# Patient Record
Sex: FEMALE | Race: WHITE | NOT HISPANIC OR LATINO | Employment: OTHER | URBAN - METROPOLITAN AREA
[De-identification: names, ages, dates, MRNs, and addresses within clinical notes are randomized per-mention and may not be internally consistent; named-entity substitution may affect disease eponyms.]

---

## 2017-02-13 ENCOUNTER — NURSE TRIAGE (OUTPATIENT)
Dept: ADMINISTRATIVE | Facility: CLINIC | Age: 82
End: 2017-02-13

## 2017-02-13 ENCOUNTER — OFFICE VISIT (OUTPATIENT)
Dept: FAMILY MEDICINE | Facility: CLINIC | Age: 82
End: 2017-02-13
Payer: MEDICARE

## 2017-02-13 ENCOUNTER — HOSPITAL ENCOUNTER (OUTPATIENT)
Dept: RADIOLOGY | Facility: HOSPITAL | Age: 82
Discharge: HOME | End: 2017-02-13
Attending: NURSE PRACTITIONER
Payer: MEDICARE

## 2017-02-13 VITALS
HEART RATE: 76 BPM | DIASTOLIC BLOOD PRESSURE: 62 MMHG | BODY MASS INDEX: 28.64 KG/M2 | HEIGHT: 68 IN | SYSTOLIC BLOOD PRESSURE: 128 MMHG | OXYGEN SATURATION: 95 % | TEMPERATURE: 98 F | WEIGHT: 189 LBS

## 2017-02-13 DIAGNOSIS — S00.83XA FACIAL CONTUSION, INITIAL ENCOUNTER: ICD-10-CM
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
## 2017-02-13 DIAGNOSIS — R06.09 DYSPNEA ON EXERTION: ICD-10-CM

## 2017-02-13 DIAGNOSIS — R06.2 WHEEZES: ICD-10-CM

## 2017-02-13 DIAGNOSIS — J18.9 PRIMARY ATYPICAL PNEUMONIA: Primary | ICD-10-CM

## 2017-02-13 DIAGNOSIS — R05.9 COUGH: ICD-10-CM

## 2017-02-13 PROCEDURE — 73030 X-RAY EXAM OF SHOULDER: CPT | Mod: TC,PO,RT

## 2017-02-13 PROCEDURE — 70150 X-RAY EXAM OF FACIAL BONES: CPT | Mod: 26,,, | Performed by: RADIOLOGY

## 2017-02-13 PROCEDURE — 1159F MED LIST DOCD IN RCRD: CPT | Mod: S$GLB,,, | Performed by: NURSE PRACTITIONER

## 2017-02-13 PROCEDURE — 1160F RVW MEDS BY RX/DR IN RCRD: CPT | Mod: S$GLB,,, | Performed by: NURSE PRACTITIONER

## 2017-02-13 PROCEDURE — 70150 X-RAY EXAM OF FACIAL BONES: CPT | Mod: TC,PO

## 2017-02-13 PROCEDURE — 73030 X-RAY EXAM OF SHOULDER: CPT | Mod: 26,RT,, | Performed by: RADIOLOGY

## 2017-02-13 PROCEDURE — 99214 OFFICE O/P EST MOD 30 MIN: CPT | Mod: S$GLB,,, | Performed by: NURSE PRACTITIONER

## 2017-02-13 PROCEDURE — 99999 PR PBB SHADOW E&M-EST. PATIENT-LVL V: CPT | Mod: PBBFAC,,, | Performed by: NURSE PRACTITIONER

## 2017-02-13 PROCEDURE — 1125F AMNT PAIN NOTED PAIN PRSNT: CPT | Mod: S$GLB,,, | Performed by: NURSE PRACTITIONER

## 2017-02-13 PROCEDURE — 1157F ADVNC CARE PLAN IN RCRD: CPT | Mod: S$GLB,,, | Performed by: NURSE PRACTITIONER

## 2017-02-13 RX ORDER — MULTIVITAMIN
1 TABLET ORAL DAILY
COMMUNITY
End: 2019-08-08 | Stop reason: SDUPTHER

## 2017-02-13 RX ORDER — ACETAMINOPHEN 500 MG
5000 TABLET ORAL DAILY
COMMUNITY

## 2017-02-13 RX ORDER — AZITHROMYCIN 250 MG/1
TABLET, FILM COATED ORAL
Qty: 6 TABLET | Refills: 0 | Status: SHIPPED | OUTPATIENT
Start: 2017-02-13 | End: 2017-02-18

## 2017-02-13 RX ORDER — GUAIFENESIN 600 MG/1
1200 TABLET, EXTENDED RELEASE ORAL 2 TIMES DAILY
COMMUNITY
End: 2018-08-23

## 2017-02-13 RX ORDER — ALBUTEROL SULFATE 90 UG/1
2 AEROSOL, METERED RESPIRATORY (INHALATION) EVERY 6 HOURS PRN
Qty: 1 INHALER | Refills: 0 | Status: SHIPPED | OUTPATIENT
Start: 2017-02-13 | End: 2017-04-12 | Stop reason: SDUPTHER

## 2017-02-13 RX ORDER — LANOLIN ALCOHOL/MO/W.PET/CERES
100 CREAM (GRAM) TOPICAL DAILY
COMMUNITY

## 2017-02-13 RX ORDER — LEVOFLOXACIN 500 MG/1
500 TABLET, FILM COATED ORAL DAILY
Qty: 10 TABLET | Refills: 0 | Status: SHIPPED | OUTPATIENT
Start: 2017-02-13 | End: 2017-02-23

## 2017-02-13 NOTE — PATIENT INSTRUCTIONS
Pneumonia (Adult)  Pneumonia is an infection deep within the lungs. It is in the small air sacs (alveoli). Pneumonia may be caused by a virus or bacteria. Pneumonia caused by bacteria is usually treated with an antibiotic. Severe cases may need to be treated in the hospital. Milder cases can be treated at home. Symptoms usually start to get better during the first 2 days of treatment.    Home care  Follow these guidelines when caring for yourself at home:  · Rest at home for the first 2 to 3 days, or until you feel stronger. Dont let yourself get overly tired when you go back to your activities.  · Stay away from cigarette smoke - yours or other peoples.  · You may use acetaminophen or ibuprofen to control fever or pain, unless another medicine was prescribed. If you have chronic liver or kidney disease, talk with your health care provider before using these medicines. Also talk with your provider if youve had a stomach ulcer or GI bleeding. Dont give aspirin to anyone younger than 18 years of age who is ill with a fever. It may cause severe liver damage.  · Your appetite may be poor, so a light diet is fine.  · Drink 6 to 8 glasses of fluids every day to make sure you are getting enough fluids. Beverages can include water, sport drinks, sodas without caffeine, juices, tea, or soup. Fluids will help loosen secretions in the lung. This will make it easier for you to cough up the phlegm (sputum). If you also have heart or kidney disease, check with your health care provider before you drink extra fluids.  · Take antibiotic medicine prescribed until it is all gone, even if you are feeling better after a few days.  Follow-up care  Follow up with your health care provider in the next 2 to 3 days, or as advised. This is to be sure the medicine is helping you get better.  If you are 65 or older, you should get a pneumococcal vaccine and a yearly flu (influenza) shot. You should also get these vaccines if you have  chronic lung disease like asthma, emphysema, or COPD. Ask your provider about this.  When to seek medical advice  Call your health care provider right away if any of these occur:  · You dont get better within the first 48 hours of treatment  · Shortness of breath gets worse  · Rapid breathing (more than 25 breaths per minute)  · Coughing up blood  · Chest pain gets worse with breathing  · Fever of 102°F (38°C) or higher that doesnt get better with fever medicine  · Weakness, dizziness, or fainting that gets worse  · Thirst or dry mouth that gets worse  · Sinus pain, headache, or a stiff neck  · Chest pain not caused by coughing  Date Last Reviewed: 12/23/2014  © 2727-9838 [employer]. [address] [place] [address], [place] [address]. All rights reserved. This information is not intended as a substitute for professional medical care. Always follow your healthcare professional's instructions.        Preventing Falls: Are You At Risk of Falling?  As you get older, you're not as steady on your feet as you once were. And you may have health problems you didn't have when you were younger. So, it's not surprising that older people are more likely to trip and fall. Falling can be very serious. It can change your overall health and quality of life. That's why it's important to be aware of your own risk of falling.    The dangers of falling  Falls are one of the main causes of injury in people over age 65. An older person who falls may take longer to get better than a younger person. And, after a fall, an older person is more likely to have problems that don't go away. So, preventing falls can help you avoid serious health problems.  Are you at risk of falling?  Answer these questions to rate your level of risk.  · Are you a woman?  · Have you fallen or stumbled in the last year?  · Are you over age 65?  · Are you ever dizzy or lightheaded with standing?  · Do you have a hard time getting in and out of the bathtub or  "on and off the toilet?  · Do you lean on objects to help you get around? Or do you use a cane or walker?  · Do you have vision or hearing problems? For example, do you need new glasses or hearing aids?  · Do you have 2 or more long-lasting (chronic) medical conditions?  · Do you take 3 or more medicines?  · Have you felt depressed recently?  · Have you had more trouble with your memory in recent months?  · Are there hazards in your home that might cause you to fall, such as loose rugs or poor lighting?  · Do you have a pet that jumps on you or might trip you?  · Have you stopped getting regular exercise?  · Do you have diabetes?   · Do you have a neurologic disease, such as Parkinson or Alzheimer disease?   · Do you drink alcohol?  · Do you wear athletic shoes or slippers, or go barefoot at home?  You can help prevent falls  If you answered "yes" to any of the above questions, you should take steps to reduce your risk of a fall. Monitoring health conditions and keeping walkways in your home free of clutter are just 2 ways. Changing is sometimes easier said than done. But keep in mind that even small changes can make you less likely to fall.  The fear of falling  It's normal to be scared of falling, especially if you've fallen before. But being afraid can actually make you more likely to fall. This is because:  · Fear might cause you to become less active. Being less active can lead to a loss of strength and balance.  · Fear can lead to isolation from others, depression, or the use of more medicines or alcohol. And all these things make falling even more likely.  To break the cycle, learn more about ways to avoid falling. As you take control, you may find yourself feeling less afraid.   Date Last Reviewed: 6/12/2015  © 3733-8330 [employer]. [address] [place] [address], [place] [address]. All rights reserved. This information is not intended as a substitute for professional medical care. Always follow your " healthcare professional's instructions.

## 2017-02-13 NOTE — MR AVS SNAPSHOT
[facility]  [address]  [place] [address]  Phone: [phone]  Fax: [phone]                  Tona Leone   2017 1:00 PM   Office Visit    Description:  Female : 1927   Provider:  LAPALCO, WALK IN   Department:  [facility]           Reason for Visit     URI     Fall           Diagnoses this Visit        Comments    Primary atypical pneumonia    -  Primary     Fall, initial encounter         Facial contusion, initial encounter         Cough         Wheezes         Dyspnea on exertion                To Do List           Future Appointments        Provider Department Dept Phone    2017 1:00 PM MERCED SAUNDERS IN [facility] [phone]      Goals (5 Years of Data)     None      Follow-Up and Disposition     Return if symptoms worsen or fail to improve.       These Medications        Disp Refills Start End    azithromycin (Z-BEHZAD) 250 MG tablet 6 tablet 0 2017    Take 2 tablets by mouth on day 1; Take 1 tablet by mouth on days 2-5    Pharmacy: [facility]/pharmacy #[id number] - [place] - [address] [facility] Ph #: [phone]       albuterol 90 mcg/actuation inhaler 1 Inhaler 0 2017     Inhale 2 puffs into the lungs every 6 (six) hours as needed for Wheezing. Rescue - Inhalation    Pharmacy: [facility]acy #[id number] - [place] - [address] [facility] Ph #: [phone]         [facility]     [facility] On Call Nurse [facility] -  Assistance  Registered nurses in the [facility] On Call Center provide clinical advisement, health education, appointment booking, and other advisory services.  Call for this free service at [phone].             Medications           Message regarding Medications     Verify the changes and/or additions to your medication regime listed below are the same as discussed with your clinician today.  If any of these changes or additions are incorrect, please notify your healthcare provider.         START taking these NEW medications        Refills    azithromycin (Z-BEHZAD) 250 MG tablet 0    Sig: Take 2 tablets by mouth on day 1; Take 1 tablet by mouth on days 2-5    Class: Normal    albuterol 90 mcg/actuation inhaler 0    Sig: Inhale 2 puffs into the lungs every 6 (six) hours as needed for Wheezing. Rescue    Class: Normal    Route: Inhalation      STOP taking these medications     FLUZONE HIGH-DOSE 2015-16, PF, 180 mcg/0.5 mL Syrg            Verify that the below list of medications is an accurate representation of the medications you are currently taking.  If none reported, the list may be blank. If incorrect, please contact your healthcare provider. Carry this list with you in case of emergency.           Current Medications     albuterol 90 mcg/actuation inhaler Inhale 2 puffs into the lungs every 6 (six) hours as needed for Wheezing. Rescue    allopurinol (ZYLOPRIM) 300 MG tablet Take 1 tablet (300 mg total) by mouth once daily.    amlodipine (NORVASC) 10 MG tablet Take 1 tablet (10 mg total) by mouth once daily.    aspirin 81 MG Chew Take 81 mg by mouth once daily.    atorvastatin (LIPITOR) 40 MG tablet Take 1 tablet (40 mg total) by mouth every evening.    azithromycin (Z-BEHZAD) 250 MG tablet Take 2 tablets by mouth on day 1; Take 1 tablet by mouth on days 2-5    cholecalciferol, vitamin D3, (VITAMIN D3) 5,000 unit Tab Take 5,000 Units by mouth once daily.    cyanocobalamin (VITAMIN B-12) 1000 MCG tablet Take 100 mcg by mouth once daily.    desonide (DESOWEN) 0.05 % lotion Apply topically 2 (two) times daily.    guaifenesin (MUCINEX) 600 mg 12 hr tablet Take 1,200 mg by mouth 2 (two) times daily.    GUAIFENESIN/DEXTROMETHORPHAN (ROBITUSSIN-COUGH-CHEST-PATRICK ORAL) Take by mouth.    hydrocodone-acetaminophen 5-325mg (NORCO) 5-325 mg per tablet     losartan (COZAAR) 100 MG tablet Take 1 tablet (100 mg total) by mouth once daily.    metoprolol tartrate (LOPRESSOR) 100 MG tablet Take 1 tablet (100 mg total) by  "mouth once daily.    multivitamin (ONE DAILY MULTIVITAMIN) per tablet Take 1 tablet by mouth once daily.    oxybutynin (DITROPAN-XL) 5 MG TR24 Take 1 tablet (5 mg total) by mouth once daily.    pantoprazole (PROTONIX) 40 MG tablet Take 1 tablet (40 mg total) by mouth once daily.    paroxetine (PAXIL) 40 MG tablet Take 1 tablet (40 mg total) by mouth once daily.    POLYETHYLENE GLYCOL 3350 (MIRALAX ORAL) Take by mouth.    potassium chloride SA (K-DUR,KLOR-CON) 20 MEQ tablet Take 1 tablet (20 mEq total) by mouth once daily.    propafenone (RHTHYMOL) 150 MG Tab Take 1 tablet (150 mg total) by mouth 2 (two) times daily.           Clinical Reference Information           Your Vitals Were     BP Pulse Temp Height Weight SpO2    128/62 (BP Location: Right arm, Patient Position: Sitting, BP Method: Manual) 76 97.5 °F (36.4 °C) (Oral) 5' 8" (1.727 m) 85.7 kg (189 lb) 95%    BMI                28.74 kg/m2          Blood Pressure          Most Recent Value    BP  128/62      Allergies as of 2/13/2017     Ciprocinonide    Niacin Preparations    Pcn [Penicillins]    Tetracyclic Antidepressants      Immunizations Administered on Date of Encounter - 2/13/2017     None      Orders Placed During Today's Visit     Future Labs/Procedures Expected by Expires    X-Ray Facial Bones  3 Or More View  2/13/2017 2/13/2018    X-ray Shoulder 2 or More Views Right  2/13/2017 2/13/2018      Instructions      Pneumonia (Adult)  Pneumonia is an infection deep within the lungs. It is in the small air sacs (alveoli). Pneumonia may be caused by a virus or bacteria. Pneumonia caused by bacteria is usually treated with an antibiotic. Severe cases may need to be treated in the hospital. Milder cases can be treated at home. Symptoms usually start to get better during the first 2 days of treatment.    Home care  Follow these guidelines when caring for yourself at home:  · Rest at home for the first 2 to 3 days, or until you feel stronger. Dont let " yourself get overly tired when you go back to your activities.  · Stay away from cigarette smoke - yours or other peoples.  · You may use acetaminophen or ibuprofen to control fever or pain, unless another medicine was prescribed. If you have chronic liver or kidney disease, talk with your health care provider before using these medicines. Also talk with your provider if youve had a stomach ulcer or GI bleeding. Dont give aspirin to anyone younger than 18 years of age who is ill with a fever. It may cause severe liver damage.  · Your appetite may be poor, so a light diet is fine.  · Drink 6 to 8 glasses of fluids every day to make sure you are getting enough fluids. Beverages can include water, sport drinks, sodas without caffeine, juices, tea, or soup. Fluids will help loosen secretions in the lung. This will make it easier for you to cough up the phlegm (sputum). If you also have heart or kidney disease, check with your health care provider before you drink extra fluids.  · Take antibiotic medicine prescribed until it is all gone, even if you are feeling better after a few days.  Follow-up care  Follow up with your health care provider in the next 2 to 3 days, or as advised. This is to be sure the medicine is helping you get better.  If you are 65 or older, you should get a pneumococcal vaccine and a yearly flu (influenza) shot. You should also get these vaccines if you have chronic lung disease like asthma, emphysema, or COPD. Ask your provider about this.  When to seek medical advice  Call your health care provider right away if any of these occur:  · You dont get better within the first 48 hours of treatment  · Shortness of breath gets worse  · Rapid breathing (more than 25 breaths per minute)  · Coughing up blood  · Chest pain gets worse with breathing  · Fever of 102°F (38°C) or higher that doesnt get better with fever medicine  · Weakness, dizziness, or fainting that gets worse  · Thirst or dry mouth  that gets worse  · Sinus pain, headache, or a stiff neck  · Chest pain not caused by coughing  Date Last Reviewed: 12/23/2014  © 2061-2942 [employer]. [address] [place] [address], [place] [address]. All rights reserved. This information is not intended as a substitute for professional medical care. Always follow your healthcare professional's instructions.        Preventing Falls: Are You At Risk of Falling?  As you get older, you're not as steady on your feet as you once were. And you may have health problems you didn't have when you were younger. So, it's not surprising that older people are more likely to trip and fall. Falling can be very serious. It can change your overall health and quality of life. That's why it's important to be aware of your own risk of falling.    The dangers of falling  Falls are one of the main causes of injury in people over age 65. An older person who falls may take longer to get better than a younger person. And, after a fall, an older person is more likely to have problems that don't go away. So, preventing falls can help you avoid serious health problems.  Are you at risk of falling?  Answer these questions to rate your level of risk.  · Are you a woman?  · Have you fallen or stumbled in the last year?  · Are you over age 65?  · Are you ever dizzy or lightheaded with standing?  · Do you have a hard time getting in and out of the bathtub or on and off the toilet?  · Do you lean on objects to help you get around? Or do you use a cane or walker?  · Do you have vision or hearing problems? For example, do you need new glasses or hearing aids?  · Do you have 2 or more long-lasting (chronic) medical conditions?  · Do you take 3 or more medicines?  · Have you felt depressed recently?  · Have you had more trouble with your memory in recent months?  · Are there hazards in your home that might cause you to fall, such as loose rugs or poor lighting?  · Do you have a pet that jumps on  "you or might trip you?  · Have you stopped getting regular exercise?  · Do you have diabetes?   · Do you have a neurologic disease, such as Parkinson or Alzheimer disease?   · Do you drink alcohol?  · Do you wear athletic shoes or slippers, or go barefoot at home?  You can help prevent falls  If you answered "yes" to any of the above questions, you should take steps to reduce your risk of a fall. Monitoring health conditions and keeping walkways in your home free of clutter are just 2 ways. Changing is sometimes easier said than done. But keep in mind that even small changes can make you less likely to fall.  The fear of falling  It's normal to be scared of falling, especially if you've fallen before. But being afraid can actually make you more likely to fall. This is because:  · Fear might cause you to become less active. Being less active can lead to a loss of strength and balance.  · Fear can lead to isolation from others, depression, or the use of more medicines or alcohol. And all these things make falling even more likely.  To break the cycle, learn more about ways to avoid falling. As you take control, you may find yourself feeling less afraid.   Date Last Reviewed: 6/12/2015  © 5480-5517 [employer]. [address] [place] [address], [place] [address]. All rights reserved. This information is not intended as a substitute for professional medical care. Always follow your healthcare professional's instructions.             Language Assistance Services     ATTENTION: Language assistance services are available, free of charge. Please call [phone].      ATENCIÓN: Si habla carolinaañol, tiene a puckett disposición servicios gratuitos de asistencia lingüística. Llame al [phone].     [facility] Ý: N?u b?n nói Ti?ng Vi?t, có các d?ch v? h? tr? ngôn ng? mi?n phí dành cho b?n. G?i s? [phone].         [facility] complies with applicable Federal civil rights laws and does not discriminate on the " basis of race, color, national origin, age, disability, or sex.

## 2017-02-13 NOTE — PROGRESS NOTES
History of Present Illness   Tona Leone is a 89 y.o. woman with medical history as listed below who presents today with her son for evaluation of multiple medical complaints. She states for the past two weeks she has had productive cough with wheezing and shortness of breath with minimal exertion. She also complains of feeling feverish with generalized fatigue and weakness. She has been taking Mucinex every 12 hours which has helped to break up the cough but has provided no additional relief. She also presents today for evaluation after a fall. She states three days ago she got up in the middle of the night to use the restroom and had a trip and fall. She denies LOC with fall. She has bruising with hematoma around left eye. She also complains of worsening of chronic right shoulder pain since fall. She has been taking her prescribed pain medication which helps with pain. She has also been using ice to the area which has helped greatly with swelling. She has no additional complaints and is otherwise healthy on today's visit.       Past Medical History   Diagnosis Date    Arthritis     Atrial fibrillation 7/26/2012    Depressed 4/8/2014    GERD (gastroesophageal reflux disease)     Gout, arthritis     Gout, unspecified 4/8/2014    HTN (hypertension) 7/26/2012    Hyperlipemia 7/26/2012    Hyperlipidemia     Hypertension     Postmenopausal 4/8/2014    Vitamin D deficiency disease 4/8/2014       Past Surgical History   Procedure Laterality Date    Cataract extraction       ou       Social History     Social History    Marital status:      Spouse name: N/A    Number of children: N/A    Years of education: N/A     Social History Main Topics    Smoking status: Former Smoker    Smokeless tobacco: None    Alcohol use No    Drug use: None    Sexual activity: Not Asked     Other Topics Concern    None     Social History Narrative       Family History   Problem Relation Age of Onset     "Hypertension Mother     Cataracts Mother     Hypertension Father     Cataracts Father     Hypertension Brother     Cataracts Brother     Amblyopia Neg Hx     Blindness Neg Hx     Cancer Neg Hx     Diabetes Neg Hx     Macular degeneration Neg Hx     Retinal detachment Neg Hx     Strabismus Neg Hx     Stroke Neg Hx     Thyroid disease Neg Hx        Review of Systems  Review of Systems   Constitutional: Positive for chills, fever and malaise/fatigue.   HENT: Negative for congestion and sore throat.    Respiratory: Positive for cough, sputum production, shortness of breath and wheezing.    Cardiovascular: Negative for chest pain and palpitations.   Musculoskeletal: Positive for falls and joint pain (right shoulder).   Skin:        Positive for bruising and abrasions     Neurological: Positive for weakness. Negative for loss of consciousness.     A complete review of systems was otherwise negative.    Physical Exam  Visit Vitals    /62 (BP Location: Right arm, Patient Position: Sitting, BP Method: Manual)    Pulse 76    Temp 97.5 °F (36.4 °C) (Oral)    Ht 5' 8" (1.727 m)    Wt 85.7 kg (189 lb)    SpO2 95%    BMI 28.74 kg/m2     General appearance: alert, appears stated age, cooperative and no distress  Head: Normocephalic, without obvious abnormality, contusion with hematoma around left eye  Eyes: negative findings: lids and lashes normal and conjunctivae and sclerae normal  Ears: normal TM's and external ear canals both ears  Nose: Nares normal. Septum midline. Mucosa normal. No drainage or sinus tenderness.  Throat: lips, mucosa, and tongue normal; teeth and gums normal  Lungs: bilateral expiratory wheeze with normal effort and rate. no rales or rhonchi  Heart: regular rate and rhythm, S1, S2 normal, no murmur, click, rub or gallop  Lymph nodes: Cervical, supraclavicular, and axillary nodes normal.  Neurologic: Grossly normal  Musculoskeletal: Limited ROM to right shoulder, no swelling or " obvious deformity. Distal sensation, ROM, and pulses intact.    Assessment/Plan  Primary atypical pneumonia  Treat with Azithromycin with Albuterol inhaler for as needed use. Continue Mucinex every 12 hours. Plenty of rest and plenty of fluids. Tylenol as needed for fever or body aches. Contact me or RTC if symptoms worsen or fail to improve as expected.  -     azithromycin (Z-BEHZAD) 250 MG tablet; Take 2 tablets by mouth on day 1; Take 1 tablet by mouth on days 2-5  Dispense: 6 tablet; Refill: 0  -     albuterol 90 mcg/actuation inhaler; Inhale 2 puffs into the lungs every 6 (six) hours as needed for Wheezing. Rescue  Dispense: 1 Inhaler; Refill: 0    Fall, initial encounter  X-ray for further evaluation. Continue to use pain medication as prescribed. May use ice to the area. Rest affected area. Contact me or RTC if symptoms worsen or fail to improve.  -     X-ray Shoulder 2 or More Views Right; Future; Expected date: 2/13/17  -     X-Ray Facial Bones  3 Or More View; Future; Expected date: 2/13/17    Facial contusion, initial encounter  As above.  -     X-ray Shoulder 2 or More Views Right; Future; Expected date: 2/13/17  -     X-Ray Facial Bones  3 Or More View; Future; Expected date: 2/13/17    Cough  As above.  -     azithromycin (Z-BEHZAD) 250 MG tablet; Take 2 tablets by mouth on day 1; Take 1 tablet by mouth on days 2-5  Dispense: 6 tablet; Refill: 0  -     albuterol 90 mcg/actuation inhaler; Inhale 2 puffs into the lungs every 6 (six) hours as needed for Wheezing. Rescue  Dispense: 1 Inhaler; Refill: 0    Wheezes  As above.  -     azithromycin (Z-BEHZAD) 250 MG tablet; Take 2 tablets by mouth on day 1; Take 1 tablet by mouth on days 2-5  Dispense: 6 tablet; Refill: 0  -     albuterol 90 mcg/actuation inhaler; Inhale 2 puffs into the lungs every 6 (six) hours as needed for Wheezing. Rescue  Dispense: 1 Inhaler; Refill: 0    Dyspnea on exertion  As above.   -     azithromycin (Z-BEHZAD) 250 MG tablet; Take 2 tablets by  mouth on day 1; Take 1 tablet by mouth on days 2-5  Dispense: 6 tablet; Refill: 0  -     albuterol 90 mcg/actuation inhaler; Inhale 2 puffs into the lungs every 6 (six) hours as needed for Wheezing. Rescue  Dispense: 1 Inhaler; Refill: 0      Return if symptoms worsen or fail to improve.

## 2017-02-14 NOTE — TELEPHONE ENCOUNTER
Son  States pt on rhythmol for irregular heart rate and is wanting to know if it is ok for pt to take azithromycin as prescribed. Dr. Donahue on call provider for outside family medicine paged  at this time.,

## 2017-02-14 NOTE — TELEPHONE ENCOUNTER
Went to a NP today and was prescribed a medication azithromycin for bacterial pneumonia. Precautions said something about an irregular heart beat. [facility] states that they would notify NP about whether or not pt can take medication.

## 2017-02-14 NOTE — TELEPHONE ENCOUNTER
Reason for Disposition   Caller has URGENT medication question about med that PCP prescribed and triager unable to answer question    Protocols used: ST MEDICATION QUESTION CALL-A-  Spoke with Dr. Donahue. Order given to contact pharmacist to see which medication would be best Levofloxacin or Doxycycline.  Contacted Rand[facility] pharmacist at [facility] on pt's profile and discussed.  Pharmacist suggested Levofloxacin.  Dr. Donahue contacted back for dosage at this time. Order given with dosage for levofloxacin. Called in to [facility] pharmacy. Levofloxacin 500mg one by mouth daily X 10 days., No refills. Spoke with caller and advised to contact pharmacy for .  Verbalized understanding.

## 2017-02-15 ENCOUNTER — TELEPHONE (OUTPATIENT)
Dept: FAMILY MEDICINE | Facility: CLINIC | Age: 82
End: 2017-02-15

## 2017-02-15 NOTE — TELEPHONE ENCOUNTER
----- Message from Jenny Zavala sent at 2/15/2017  1:24 PM CST -----  Contact: self  Pt needs XRAY results. Please call [phone]. Thanks

## 2017-04-10 DIAGNOSIS — R06.2 WHEEZES: ICD-10-CM

## 2017-04-10 DIAGNOSIS — J18.9 PRIMARY ATYPICAL PNEUMONIA: ICD-10-CM

## 2017-04-10 DIAGNOSIS — R05.9 COUGH: ICD-10-CM

## 2017-04-10 DIAGNOSIS — R06.09 DYSPNEA ON EXERTION: ICD-10-CM

## 2017-04-10 RX ORDER — ALBUTEROL SULFATE 90 UG/1
AEROSOL, METERED RESPIRATORY (INHALATION)
Qty: 18 INHALER | Refills: 0 | OUTPATIENT
Start: 2017-04-10

## 2017-04-12 DIAGNOSIS — R06.09 DYSPNEA ON EXERTION: ICD-10-CM

## 2017-04-12 DIAGNOSIS — R05.9 COUGH: ICD-10-CM

## 2017-04-12 DIAGNOSIS — J18.9 PRIMARY ATYPICAL PNEUMONIA: ICD-10-CM

## 2017-04-12 DIAGNOSIS — R06.2 WHEEZES: ICD-10-CM

## 2017-04-12 RX ORDER — ALBUTEROL SULFATE 90 UG/1
2 AEROSOL, METERED RESPIRATORY (INHALATION) EVERY 6 HOURS PRN
Qty: 1 INHALER | Refills: 0 | Status: SHIPPED | OUTPATIENT
Start: 2017-04-12 | End: 2019-05-16 | Stop reason: CLARIF

## 2017-04-12 NOTE — TELEPHONE ENCOUNTER
----- Message from Aye Drummond sent at 4/12/2017  2:52 PM CDT -----  Contact: daughter  Pt daughter calling to discuss getting a refill of pt Ventolin. Please call to verify medication first at Nory [phone].

## 2017-06-29 ENCOUNTER — OFFICE VISIT (OUTPATIENT)
Dept: FAMILY MEDICINE | Facility: CLINIC | Age: 82
End: 2017-06-29
Payer: MEDICARE

## 2017-06-29 VITALS
SYSTOLIC BLOOD PRESSURE: 114 MMHG | DIASTOLIC BLOOD PRESSURE: 60 MMHG | TEMPERATURE: 98 F | OXYGEN SATURATION: 94 % | HEIGHT: 68 IN | HEART RATE: 74 BPM

## 2017-06-29 DIAGNOSIS — R06.81 APNEA: ICD-10-CM

## 2017-06-29 DIAGNOSIS — I50.21 ACUTE SYSTOLIC HEART FAILURE: ICD-10-CM

## 2017-06-29 DIAGNOSIS — I48.0 PAROXYSMAL ATRIAL FIBRILLATION: Primary | ICD-10-CM

## 2017-06-29 DIAGNOSIS — I73.9 PAD (PERIPHERAL ARTERY DISEASE): ICD-10-CM

## 2017-06-29 PROCEDURE — 1159F MED LIST DOCD IN RCRD: CPT | Mod: S$GLB,,, | Performed by: NURSE PRACTITIONER

## 2017-06-29 PROCEDURE — 99214 OFFICE O/P EST MOD 30 MIN: CPT | Mod: S$GLB,,, | Performed by: NURSE PRACTITIONER

## 2017-06-29 PROCEDURE — 99999 PR PBB SHADOW E&M-EST. PATIENT-LVL IV: CPT | Mod: PBBFAC,,, | Performed by: NURSE PRACTITIONER

## 2017-06-29 PROCEDURE — 99499 UNLISTED E&M SERVICE: CPT | Mod: S$GLB,,, | Performed by: NURSE PRACTITIONER

## 2017-06-29 RX ORDER — FUROSEMIDE 20 MG/1
20 TABLET ORAL 2 TIMES DAILY
COMMUNITY
End: 2019-05-10 | Stop reason: SDUPTHER

## 2017-06-29 RX ORDER — METOPROLOL TARTRATE 25 MG/1
25 TABLET, FILM COATED ORAL 2 TIMES DAILY
Qty: 60 TABLET | Refills: 11 | Status: SHIPPED | OUTPATIENT
Start: 2017-06-29 | End: 2017-09-06

## 2017-06-29 NOTE — PROGRESS NOTES
This dictation has been generated using Dragon Dictation some phonetic errors may occur.     Tona was seen today for hospital follow up.    Diagnoses and all orders for this visit:    Paroxysmal atrial fibrillation  -     Polysomnogram (CPAP will be added if patient meets diagnostic criteria.); Future    Acute systolic heart failure  -     Cancel: Ambulatory consult to Sleep Disorders  -     Polysomnogram (CPAP will be added if patient meets diagnostic criteria.); Future    PAD (peripheral artery disease)  -     Polysomnogram (CPAP will be added if patient meets diagnostic criteria.); Future    Apnea  -     Cancel: Ambulatory consult to Sleep Disorders  -     Polysomnogram (CPAP will be added if patient meets diagnostic criteria.); Future    Other orders  -     apixaban 2.5 mg Tab; Take 1 tablet (2.5 mg total) by mouth 2 (two) times daily.  -     metoprolol tartrate (LOPRESSOR) 25 MG tablet; Take 1 tablet (25 mg total) by mouth 2 (two) times daily.      Atrial fibrillation and onset of acute heart failure.  Recent admit to [facility].  Patient went home on BiPAP.  Will need titration study so ordered STAT.  Patient's family requested 24 hour care.  I have requested that they have the [place] nurse call us regarding that issue.  I am unaware of any orders or order tree to accommodate that request.  Primary care physician would have to place orders for home health orders as they will not accept orders from NP.   I will request records from recent admit.  If it is obtained and will be reviewed    Return if symptoms worsen or fail to improve.      ________________________________________________________________  ________________________________________________________________        Chief Complaint   Patient presents with    Hospital Follow Up     History of present illness  This 89 y.o. presents today for complaint of atrial fibrillation, CHF new-onset, pneumonia, sleep apnea, and  follow-up from [facility].  Patient presents to the clinic with her son and daughter(Nory).  They get most of the history.  Patient was recently admitted to [facility] for about 3 weeks.  She was in the critical care unit.  New-onset CHF with atrial fibrillation pneumonia and sleep apnea noted.  She went home on BiPAP and needs a titration study outpatient.  They have the [facility] paperwork and patient discharge instructions but [facility] medical record not available for my review.  Patient had a pacemaker placed.  She has home health care with [facility].  They have questions about what sodium restriction and fluid restriction patient should be on.  The paperwork indicates a 2 g sodium diet and we discussed that at today's visit.  We also discussed 2 L of fluid or less per day.  She has all of her medicines and was started on Pradaxa.  Metoprolol was adjusted.  Patient denies any chest pain or shortness of breath.  She notes some lower extremity edema.  Review of systems  No fever or chills  Denies chest pain  No nausea vomiting or diarrhea.  Appetite fair.    Past medical and social history reviewed    Past Medical History:   Diagnosis Date    Arthritis     Atrial fibrillation 7/26/2012    Depressed 4/8/2014    GERD (gastroesophageal reflux disease)     Gout, arthritis     Gout, unspecified 4/8/2014    HTN (hypertension) 7/26/2012    Hyperlipemia 7/26/2012    Hyperlipidemia     Hypertension     Postmenopausal 4/8/2014    Vitamin D deficiency disease 4/8/2014       Past Surgical History:   Procedure Laterality Date    CATARACT EXTRACTION      ou       Family History   Problem Relation Age of Onset    Hypertension Mother     Cataracts Mother     Hypertension Father     Cataracts Father     Hypertension Brother     Cataracts Brother     Amblyopia Neg Hx     Blindness Neg Hx     Cancer Neg Hx     Diabetes Neg Hx     Macular degeneration Neg Hx     Retinal detachment  Neg Hx     Strabismus Neg Hx     Stroke Neg Hx     Thyroid disease Neg Hx        Social History     Social History    Marital status:      Spouse name: N/A    Number of children: N/A    Years of education: N/A     Social History Main Topics    Smoking status: Former Smoker    Smokeless tobacco: None    Alcohol use No    Drug use: Unknown    Sexual activity: Not Asked     Other Topics Concern    None     Social History Narrative    None       Current Outpatient Prescriptions   Medication Sig Dispense Refill    albuterol 90 mcg/actuation inhaler Inhale 2 puffs into the lungs every 6 (six) hours as needed for Wheezing. Rescue 1 Inhaler 0    allopurinol (ZYLOPRIM) 300 MG tablet Take 1 tablet (300 mg total) by mouth once daily. 90 tablet 12    amlodipine (NORVASC) 10 MG tablet Take 1 tablet (10 mg total) by mouth once daily. 90 tablet 4    aspirin 81 MG Chew Take 81 mg by mouth once daily.      atorvastatin (LIPITOR) 40 MG tablet Take 1 tablet (40 mg total) by mouth every evening. 90 tablet 4    cholecalciferol, vitamin D3, (VITAMIN D3) 5,000 unit Tab Take 5,000 Units by mouth once daily.      cyanocobalamin (VITAMIN B-12) 1000 MCG tablet Take 100 mcg by mouth once daily.      furosemide (LASIX) 20 MG tablet Take 20 mg by mouth 2 (two) times daily.      guaifenesin (MUCINEX) 600 mg 12 hr tablet Take 1,200 mg by mouth 2 (two) times daily.      hydrocodone-acetaminophen 5-325mg (NORCO) 5-325 mg per tablet   0    losartan (COZAAR) 100 MG tablet Take 1 tablet (100 mg total) by mouth once daily. 90 tablet 3    multivitamin (ONE DAILY MULTIVITAMIN) per tablet Take 1 tablet by mouth once daily.      oxybutynin (DITROPAN-XL) 5 MG TR24 Take 1 tablet (5 mg total) by mouth once daily. 30 tablet 11    pantoprazole (PROTONIX) 40 MG tablet Take 1 tablet (40 mg total) by mouth once daily. 90 tablet 4    paroxetine (PAXIL) 40 MG tablet Take 1 tablet (40 mg total) by mouth once daily. 90 tablet 4     POLYETHYLENE GLYCOL 3350 (MIRALAX ORAL) Take by mouth.      potassium chloride SA (K-DUR,KLOR-CON) 20 MEQ tablet Take 1 tablet (20 mEq total) by mouth once daily. 90 tablet 4    propafenone (RHTHYMOL) 150 MG Tab Take 1 tablet (150 mg total) by mouth 2 (two) times daily. 180 tablet 3    apixaban 2.5 mg Tab Take 1 tablet (2.5 mg total) by mouth 2 (two) times daily. 60 tablet 11    desonide (DESOWEN) 0.05 % lotion Apply topically 2 (two) times daily. 118 mL 12    metoprolol tartrate (LOPRESSOR) 25 MG tablet Take 1 tablet (25 mg total) by mouth 2 (two) times daily. 60 tablet 11     No current facility-administered medications for this visit.        Review of patient's allergies indicates:   Allergen Reactions    Ciprocinonide     Niacin preparations     Pcn [penicillins]     Tetracyclic antidepressants        Physical examination  Vitals Reviewed  Gen. Well-dressed well-nourished no apparent distress  Skin warm dry and intact.  No rashes noted.  Neck is supple without adenopathy  Chest.  Respirations are even unlabored.  Lungs are clear to auscultation.  Cardiac regular rate and rhythm.  No chest wall adenopathy noted.  Abdomen is soft and not distended.  Bowel sounds are present.  No tenderness during palpation of the abdomen.  No Hepatosplenomegaly noted.  No hernia noted.  No CVA tenderness to percussion.    Neuro. Awake alert oriented x4.  Normal judgment and cognition noted.  Extremities no clubbing cyanosis or edema noted.     Call or return to clinic prn if these symptoms worsen or fail to improve as anticipated.

## 2017-06-30 ENCOUNTER — TELEPHONE (OUTPATIENT)
Dept: FAMILY MEDICINE | Facility: CLINIC | Age: 82
End: 2017-06-30

## 2017-06-30 NOTE — TELEPHONE ENCOUNTER
----- Message from Mackenzie Sevilla sent at 6/30/2017 12:09 PM CDT -----  Contact: Jaida [phone]  [facility] is faxing over a order for the pt Thanks !

## 2017-07-07 ENCOUNTER — TELEPHONE (OUTPATIENT)
Dept: SLEEP MEDICINE | Facility: OTHER | Age: 82
End: 2017-07-07

## 2017-07-10 ENCOUNTER — TELEPHONE (OUTPATIENT)
Dept: FAMILY MEDICINE | Facility: CLINIC | Age: 82
End: 2017-07-10

## 2017-07-10 NOTE — TELEPHONE ENCOUNTER
----- Message from Corinna Sosa sent at 7/10/2017  8:51 AM CDT -----  Contact: Laura- [facility]  Laura rice [facility] says [facility] called to schedule patient but her family would like her to go to [facility] because its easier for them and closer to her home. Please send order to  sleep study. Please call Laura rice [facility] at [phone]

## 2017-07-11 ENCOUNTER — TELEPHONE (OUTPATIENT)
Dept: FAMILY MEDICINE | Facility: CLINIC | Age: 82
End: 2017-07-11

## 2017-07-11 NOTE — TELEPHONE ENCOUNTER
----- Message from Mackenzie Sevilla sent at 7/11/2017 12:55 PM CDT -----  [phone]  [facility] that was supposed to be sent to [place] Thanks !

## 2017-07-12 RX ORDER — LOSARTAN POTASSIUM 100 MG/1
100 TABLET ORAL DAILY
Qty: 90 TABLET | Refills: 3 | Status: SHIPPED | OUTPATIENT
Start: 2017-07-12 | End: 2018-09-25 | Stop reason: SDUPTHER

## 2017-07-17 RX ORDER — HYDROCODONE BITARTRATE AND ACETAMINOPHEN 5; 325 MG/1; MG/1
TABLET ORAL
Refills: 0 | Status: CANCELLED | OUTPATIENT
Start: 2017-07-17

## 2017-07-17 NOTE — TELEPHONE ENCOUNTER
Does not appear that Dr. Kendall fills this rx for her.  Looked up on  [employer].  Please clarify with family.

## 2017-07-17 NOTE — TELEPHONE ENCOUNTER
----- Message from Jenny Zavala sent at 7/17/2017 11:28 AM CDT -----  Contact: Rosa Leone Care Taker  Pt needs hydrocodone-acetaminophen 5-325mg (NORCO) 5-325 mg per tablet refilled... Please call Rosa Leone [phone]. Thanks

## 2017-07-26 ENCOUNTER — TELEPHONE (OUTPATIENT)
Dept: FAMILY MEDICINE | Facility: CLINIC | Age: 82
End: 2017-07-26

## 2017-07-26 NOTE — TELEPHONE ENCOUNTER
----- Message from Mackenzie Sevilla sent at 7/26/2017  1:01 PM CDT -----  Contact: Laura @ [employer]  [phone]  [facility] [place] has not received the pt order for a sleep study please fax order again to [phone] Thanks !

## 2017-07-26 NOTE — TELEPHONE ENCOUNTER
----- Message from Kaitlynn Rooney sent at 7/26/2017 12:38 PM CDT -----  Contact: Shana Sanchez called concerning orders for the pt's sleep study. Please advise. [phone]

## 2017-07-27 NOTE — TELEPHONE ENCOUNTER
----- Message from Mackenzie Sevilla sent at 7/27/2017 12:07 PM CDT -----  Contact: Laura @ [employer]  [phone]  Laura is following up on the request for a sleep study for the pt  Please call at your earliest convenience.  Thanks !

## 2017-07-28 ENCOUNTER — TELEPHONE (OUTPATIENT)
Dept: FAMILY MEDICINE | Facility: CLINIC | Age: 82
End: 2017-07-28

## 2017-07-28 NOTE — TELEPHONE ENCOUNTER
----- Message from Mackenzie Sevilla sent at 7/27/2017  4:44 PM CDT -----  Contact: Sravanthi @[employer] [phone]  [employer] is requesting a call back in regards to sleep order that they received for the pt but the pt also has a sleep study order for [facility] that is still good so they need to make sure where the sleep study will be done at Please call at your earliest convenience.  Thanks !

## 2017-08-03 ENCOUNTER — TELEPHONE (OUTPATIENT)
Dept: FAMILY MEDICINE | Facility: CLINIC | Age: 82
End: 2017-08-03

## 2017-08-03 NOTE — TELEPHONE ENCOUNTER
----- Message from Kaitlynn Rooney sent at 8/3/2017  2:43 PM CDT -----  Contact: nory-daughter  Nory is asking if the pt is able to take ventolin hfa.Nory is requesting a chest xray.  Please advise. [phone]

## 2017-08-16 NOTE — TELEPHONE ENCOUNTER
----- Message from Corinna Sosa sent at 8/16/2017  1:55 PM CDT -----  Contact: Daughter- Nory  Patient need a refill . Please call patient at [phone] ( patient is on HH and was told by the nurse aleksandra Chacon to refill meds )     hydrocodone-acetaminophen 5-325mg (NORCO) 5-325 mg per tablet

## 2017-08-17 RX ORDER — HYDROCODONE BITARTRATE AND ACETAMINOPHEN 5; 325 MG/1; MG/1
TABLET ORAL
Refills: 0 | Status: CANCELLED | OUTPATIENT
Start: 2017-08-17

## 2017-08-17 NOTE — TELEPHONE ENCOUNTER
Please call family, patient really not seen by me in over a year, last July.  She has seen other providers but it's very difficult if not legal to do refill the hydrocodone.    We need more information about was going on currently and with the need for the pain medication is    Perhaps there other medications we can give

## 2017-08-22 DIAGNOSIS — R52 PAIN: Primary | ICD-10-CM

## 2017-08-22 RX ORDER — HYDROCODONE BITARTRATE AND ACETAMINOPHEN 5; 325 MG/1; MG/1
1 TABLET ORAL EVERY 12 HOURS PRN
Qty: 60 TABLET | Refills: 0 | Status: CANCELLED | OUTPATIENT
Start: 2017-08-22

## 2017-08-22 NOTE — TELEPHONE ENCOUNTER
Explain issue w controlled meds -when was last time saw Dr Licona?    Been over a year since seen Dr MALONE also    Docs are being watched very closely about these meds    Let me know their plan to see ANY doctors

## 2017-08-22 NOTE — TELEPHONE ENCOUNTER
Explained to patient's daughter the policy of being seen every 3 months, she stated that she just saw Callum in June because you were not available.

## 2017-08-22 NOTE — TELEPHONE ENCOUNTER
----- Message from Mackenzie Sevilla sent at 8/22/2017 10:14 AM CDT -----  Contact: ANNE (daughter)[phone]  Pt daughter is requesting a call back today in regards to the pt Please call  at your earliest convenience.  Thanks!

## 2017-08-23 NOTE — TELEPHONE ENCOUNTER
Explain how controlled this is and you must see the doctor signing the scripts. Clarify if not able to see DR Licona, how will she be able to see Dr MALONE?    Do not want her to be in pain BUT we need to clear this up-- if able to see Dr Licona she may need to continue

## 2017-08-28 ENCOUNTER — TELEPHONE (OUTPATIENT)
Dept: FAMILY MEDICINE | Facility: CLINIC | Age: 82
End: 2017-08-28

## 2017-08-28 NOTE — TELEPHONE ENCOUNTER
----- Message from Kaity Granda sent at 8/28/2017  1:07 PM CDT -----  Regarding: sleep study  Hello,  The pt is on  A loaner bipap from us we need the copy of the sleep study that was ordered. Pt son states that the study was done at [facility]. Can you please fax a copy [phone]. Thanks, Kaity

## 2017-08-28 NOTE — TELEPHONE ENCOUNTER
----- Message from Kaity Granda sent at 8/28/2017  1:07 PM CDT -----  Regarding: sleep study  Hello,  The pt is on  A loaner bipap from us we need the copy of the sleep study that was ordered. Pt son states that the study was done at [facility]. Can you please fax a copy [phone]. Thanks, Kaity

## 2017-08-28 NOTE — TELEPHONE ENCOUNTER
Left message at [facility] for a  Return call. Sleep study was done there and patient rented the machine starting 6/26/17 and is good for 1 year but need a copy of the actual study.

## 2017-08-29 ENCOUNTER — TELEPHONE (OUTPATIENT)
Dept: FAMILY MEDICINE | Facility: CLINIC | Age: 82
End: 2017-08-29

## 2017-08-29 NOTE — TELEPHONE ENCOUNTER
Left information on machine for Sravanthi, patient had sleep study 8/24 at [facility] and copy was made for patient, and also scanned into chart.

## 2017-08-29 NOTE — TELEPHONE ENCOUNTER
----- Message from Britany Delaney sent at 8/29/2017  1:10 PM CDT -----  Contact: Nory - daughter   Nory would like to know if nurse received fax for pt sleep study.  Please call [phone]

## 2017-09-06 ENCOUNTER — OFFICE VISIT (OUTPATIENT)
Dept: FAMILY MEDICINE | Facility: CLINIC | Age: 82
End: 2017-09-06
Payer: MEDICARE

## 2017-09-06 VITALS
DIASTOLIC BLOOD PRESSURE: 68 MMHG | WEIGHT: 187 LBS | BODY MASS INDEX: 28.34 KG/M2 | OXYGEN SATURATION: 96 % | HEIGHT: 68 IN | SYSTOLIC BLOOD PRESSURE: 128 MMHG | HEART RATE: 111 BPM | TEMPERATURE: 98 F

## 2017-09-06 DIAGNOSIS — I48.0 PAROXYSMAL ATRIAL FIBRILLATION: ICD-10-CM

## 2017-09-06 DIAGNOSIS — E78.5 HYPERLIPIDEMIA, UNSPECIFIED HYPERLIPIDEMIA TYPE: ICD-10-CM

## 2017-09-06 DIAGNOSIS — K27.9 PEPTIC ULCER DISEASE: ICD-10-CM

## 2017-09-06 DIAGNOSIS — G89.29 OTHER CHRONIC PAIN: Primary | ICD-10-CM

## 2017-09-06 DIAGNOSIS — L89.159 DECUBITUS ULCER OF SACRAL REGION, UNSPECIFIED ULCER STAGE: ICD-10-CM

## 2017-09-06 DIAGNOSIS — R05.9 COUGH: ICD-10-CM

## 2017-09-06 DIAGNOSIS — E55.9 VITAMIN D DEFICIENCY DISEASE: ICD-10-CM

## 2017-09-06 PROCEDURE — 3008F BODY MASS INDEX DOCD: CPT | Mod: S$GLB,,, | Performed by: INTERNAL MEDICINE

## 2017-09-06 PROCEDURE — 99999 PR PBB SHADOW E&M-EST. PATIENT-LVL III: CPT | Mod: PBBFAC,,, | Performed by: INTERNAL MEDICINE

## 2017-09-06 PROCEDURE — 1159F MED LIST DOCD IN RCRD: CPT | Mod: S$GLB,,, | Performed by: INTERNAL MEDICINE

## 2017-09-06 PROCEDURE — 99499 UNLISTED E&M SERVICE: CPT | Mod: S$GLB,,, | Performed by: INTERNAL MEDICINE

## 2017-09-06 PROCEDURE — 99215 OFFICE O/P EST HI 40 MIN: CPT | Mod: S$GLB,,, | Performed by: INTERNAL MEDICINE

## 2017-09-06 RX ORDER — POLYETHYLENE GLYCOL 3350 17 G/17G
17 POWDER, FOR SOLUTION ORAL DAILY PRN
COMMUNITY
Start: 2017-06-24 | End: 2019-08-08 | Stop reason: SDUPTHER

## 2017-09-06 RX ORDER — METOPROLOL TARTRATE 25 MG/1
25 TABLET, FILM COATED ORAL 2 TIMES DAILY
Qty: 180 TABLET | Refills: 11 | Status: SHIPPED | OUTPATIENT
Start: 2017-09-06 | End: 2018-08-23

## 2017-09-06 RX ORDER — HYDROCODONE BITARTRATE AND ACETAMINOPHEN 5; 325 MG/1; MG/1
1 TABLET ORAL EVERY 12 HOURS PRN
Qty: 60 TABLET | Refills: 0 | Status: SHIPPED | OUTPATIENT
Start: 2017-09-06 | End: 2017-10-05 | Stop reason: SDUPTHER

## 2017-09-07 ENCOUNTER — TELEPHONE (OUTPATIENT)
Dept: FAMILY MEDICINE | Facility: CLINIC | Age: 82
End: 2017-09-07

## 2017-09-07 RX ORDER — ATORVASTATIN CALCIUM 40 MG/1
40 TABLET, FILM COATED ORAL NIGHTLY
Qty: 90 TABLET | Refills: 3 | Status: SHIPPED | OUTPATIENT
Start: 2017-09-07 | End: 2018-08-30 | Stop reason: SDUPTHER

## 2017-09-07 NOTE — TELEPHONE ENCOUNTER
----- Message from Jyoti Winn sent at 9/7/2017 11:10 AM CDT -----  Contact: Cailin/SHANNAN/[phone]  Cailin called regarding patient's coverage decision request. The patient's member id# is [id number]. She states that the patient's request does not meet the criteria and she's requesting for expedited review. The review  is still pending. The patient's reference number is [id number].  For any questions please call member services at [phone]. Thank you.

## 2017-09-14 RX ORDER — AMLODIPINE BESYLATE 10 MG/1
10 TABLET ORAL DAILY
Qty: 90 TABLET | Refills: 3 | Status: SHIPPED | OUTPATIENT
Start: 2017-09-14 | End: 2018-08-23

## 2017-09-20 ENCOUNTER — TELEPHONE (OUTPATIENT)
Dept: FAMILY MEDICINE | Facility: CLINIC | Age: 82
End: 2017-09-20

## 2017-09-20 NOTE — TELEPHONE ENCOUNTER
----- Message from Kellie Sosa sent at 9/19/2017  9:06 AM CDT -----  [facility] patient has sore on left forearm. It looks like a cluster of lumpy, blister type of wound, it was draining some fluid. Nurse wants to know if she should see a dermatologist, and if so can they get a referral. Nurse Locke can be reached at [phone] Thank you!

## 2017-09-20 NOTE — TELEPHONE ENCOUNTER
Trying get more information, discussed with patient or her family as to how long this skin lesion has been there, any idea how it got there?    Probably easier and more convenient for patient to come here and be seen, she can see Ritchie or other

## 2017-09-25 ENCOUNTER — OFFICE VISIT (OUTPATIENT)
Dept: FAMILY MEDICINE | Facility: CLINIC | Age: 82
End: 2017-09-25
Payer: MEDICARE

## 2017-09-25 ENCOUNTER — TELEPHONE (OUTPATIENT)
Dept: FAMILY MEDICINE | Facility: CLINIC | Age: 82
End: 2017-09-25

## 2017-09-25 VITALS
TEMPERATURE: 98 F | DIASTOLIC BLOOD PRESSURE: 50 MMHG | OXYGEN SATURATION: 97 % | HEIGHT: 68 IN | HEART RATE: 88 BPM | SYSTOLIC BLOOD PRESSURE: 122 MMHG

## 2017-09-25 DIAGNOSIS — L98.9 SKIN LESION: Primary | ICD-10-CM

## 2017-09-25 PROCEDURE — 99213 OFFICE O/P EST LOW 20 MIN: CPT | Mod: S$GLB,,, | Performed by: NURSE PRACTITIONER

## 2017-09-25 PROCEDURE — 99999 PR PBB SHADOW E&M-EST. PATIENT-LVL IV: CPT | Mod: PBBFAC,,, | Performed by: NURSE PRACTITIONER

## 2017-09-25 PROCEDURE — 3008F BODY MASS INDEX DOCD: CPT | Mod: S$GLB,,, | Performed by: NURSE PRACTITIONER

## 2017-09-25 PROCEDURE — 1159F MED LIST DOCD IN RCRD: CPT | Mod: S$GLB,,, | Performed by: NURSE PRACTITIONER

## 2017-09-25 NOTE — PROGRESS NOTES
This dictation has been generated using Dragon Dictation some phonetic errors may occur.     Tona was seen today for arm injury.    Diagnoses and all orders for this visit:    Skin lesion  -     Ambulatory referral to Dermatology      Skin lesion left forearm refer to dermatology for biopsy.    No Follow-up on file.  ________________________________________________________________  ________________________________________________________________    Chief Complaint   Patient presents with    Arm Injury     History of present illness  This 89 y.o. presents today for complaint of skin problem forearm.  There is nodular exposed tissue left forearm.  Trace bleeding is noted.  No drainage observed.  Symptoms of her present for more than a month.  Her family notes that is not healing.  Medical history no cancer.  Review of systems  No fever or chills  No gland swelling    Past Medical History:   Diagnosis Date    Arthritis     Atrial fibrillation 7/26/2012    Depressed 4/8/2014    GERD (gastroesophageal reflux disease)     Gout, arthritis     Gout, unspecified 4/8/2014    HTN (hypertension) 7/26/2012    Hyperlipemia 7/26/2012    Hyperlipidemia     Hypertension     Postmenopausal 4/8/2014    Vitamin D deficiency disease 4/8/2014       Past Surgical History:   Procedure Laterality Date    CATARACT EXTRACTION      ou       Family History   Problem Relation Age of Onset    Hypertension Mother     Cataracts Mother     Hypertension Father     Cataracts Father     Hypertension Brother     Cataracts Brother     Amblyopia Neg Hx     Blindness Neg Hx     Cancer Neg Hx     Diabetes Neg Hx     Macular degeneration Neg Hx     Retinal detachment Neg Hx     Strabismus Neg Hx     Stroke Neg Hx     Thyroid disease Neg Hx        Social History     Social History    Marital status:      Spouse name: N/A    Number of children: N/A    Years of education: N/A     Social History Main Topics    Smoking  status: Former Smoker    Smokeless tobacco: Never Used    Alcohol use No    Drug use: Unknown    Sexual activity: Not Asked     Other Topics Concern    None     Social History Narrative    None       Current Outpatient Prescriptions   Medication Sig Dispense Refill    allopurinol (ZYLOPRIM) 300 MG tablet Take 1 tablet (300 mg total) by mouth once daily. 90 tablet 12    amlodipine (NORVASC) 10 MG tablet TAKE 1 TABLET (10 MG TOTAL) BY MOUTH ONCE DAILY. 90 tablet 3    aspirin 81 MG Chew Take 81 mg by mouth once daily.      atorvastatin (LIPITOR) 40 MG tablet TAKE 1 TABLET (40 MG TOTAL) BY MOUTH EVERY EVENING. 90 tablet 3    cholecalciferol, vitamin D3, (VITAMIN D3) 5,000 unit Tab Take 5,000 Units by mouth once daily.      cyanocobalamin (VITAMIN B-12) 1000 MCG tablet Take 100 mcg by mouth once daily.      furosemide (LASIX) 20 MG tablet Take 20 mg by mouth 2 (two) times daily.      hydrocodone-acetaminophen 5-325mg (NORCO) 5-325 mg per tablet Take 1 tablet by mouth every 12 (twelve) hours as needed for Pain. 60 tablet 0    losartan (COZAAR) 100 MG tablet Take 1 tablet (100 mg total) by mouth once daily. 90 tablet 3    metoprolol tartrate (LOPRESSOR) 25 MG tablet Take 1 tablet (25 mg total) by mouth 2 (two) times daily. 180 tablet 11    multivitamin (ONE DAILY MULTIVITAMIN) per tablet Take 1 tablet by mouth once daily.      pantoprazole (PROTONIX) 40 MG tablet Take 1 tablet (40 mg total) by mouth once daily. 90 tablet 4    POLYETHYLENE GLYCOL 3350 (MIRALAX ORAL) Take by mouth.      albuterol 90 mcg/actuation inhaler Inhale 2 puffs into the lungs every 6 (six) hours as needed for Wheezing. Rescue 1 Inhaler 0    apixaban 2.5 mg Tab Take 1 tablet (2.5 mg total) by mouth 2 (two) times daily. 60 tablet 11    desonide (DESOWEN) 0.05 % lotion Apply topically 2 (two) times daily. 118 mL 12    guaifenesin (MUCINEX) 600 mg 12 hr tablet Take 1,200 mg by mouth 2 (two) times daily.      oxybutynin  (DITROPAN-XL) 5 MG TR24 Take 1 tablet (5 mg total) by mouth once daily. 30 tablet 11    paroxetine (PAXIL) 40 MG tablet Take 1 tablet (40 mg total) by mouth once daily. 90 tablet 4    polyethylene glycol (GLYCOLAX) 17 gram/dose powder       potassium chloride SA (K-DUR,KLOR-CON) 20 MEQ tablet Take 1 tablet (20 mEq total) by mouth once daily. 90 tablet 4    propafenone (RHTHYMOL) 150 MG Tab Take 1 tablet (150 mg total) by mouth 2 (two) times daily. 180 tablet 3     No current facility-administered medications for this visit.        Review of patient's allergies indicates:   Allergen Reactions    Ciprocinonide     Niacin preparations     Pcn [penicillins]     Tetracyclic antidepressants        Physical examination  Vitals Reviewed  Gen. Well-dressed well-nourished no apparent distress  Skin warm dry and intact.  No rashes noted.  Growth like formation on the left forearm with skin irritation and serosanguineous drainage.  No abscess collection.  Somewhat warty appearance.   Neck is supple without adenopathy  Chest.  Respirations are even unlabored.    Neuro. Awake alert oriented x4.  Normal judgment and cognition noted.  Extremities no clubbing cyanosis or edema noted.     Call or return to clinic prn if these symptoms worsen or fail to improve as anticipated.

## 2017-09-25 NOTE — TELEPHONE ENCOUNTER
Spoke to  daughter Nory and told her about the nerve block that her mother will be evaluated for when she see pain management. She verbally understood.

## 2017-09-26 ENCOUNTER — TELEPHONE (OUTPATIENT)
Dept: PAIN MEDICINE | Facility: CLINIC | Age: 82
End: 2017-09-26

## 2017-09-26 NOTE — TELEPHONE ENCOUNTER
Reminded patient's daughter of Pain Management appointment scheduled for tomorrow at 11:00 am with Dr. Paul- verbal confirmation received.

## 2017-09-27 ENCOUNTER — TELEPHONE (OUTPATIENT)
Dept: FAMILY MEDICINE | Facility: CLINIC | Age: 82
End: 2017-09-27

## 2017-09-27 ENCOUNTER — OFFICE VISIT (OUTPATIENT)
Dept: PAIN MEDICINE | Facility: CLINIC | Age: 82
End: 2017-09-27
Payer: MEDICARE

## 2017-09-27 VITALS
HEART RATE: 78 BPM | OXYGEN SATURATION: 99 % | RESPIRATION RATE: 18 BRPM | DIASTOLIC BLOOD PRESSURE: 66 MMHG | SYSTOLIC BLOOD PRESSURE: 118 MMHG

## 2017-09-27 DIAGNOSIS — M25.562 CHRONIC PAIN OF LEFT KNEE: Primary | ICD-10-CM

## 2017-09-27 DIAGNOSIS — M25.511 CHRONIC RIGHT SHOULDER PAIN: ICD-10-CM

## 2017-09-27 DIAGNOSIS — G89.29 CHRONIC RIGHT SHOULDER PAIN: ICD-10-CM

## 2017-09-27 DIAGNOSIS — M17.12 ARTHRITIS OF LEFT KNEE: ICD-10-CM

## 2017-09-27 DIAGNOSIS — G89.29 CHRONIC PAIN OF LEFT KNEE: Primary | ICD-10-CM

## 2017-09-27 PROCEDURE — 99999 PR PBB SHADOW E&M-EST. PATIENT-LVL III: CPT | Mod: PBBFAC,,, | Performed by: PAIN MEDICINE

## 2017-09-27 PROCEDURE — 3008F BODY MASS INDEX DOCD: CPT | Mod: S$GLB,,, | Performed by: PAIN MEDICINE

## 2017-09-27 PROCEDURE — 1125F AMNT PAIN NOTED PAIN PRSNT: CPT | Mod: S$GLB,,, | Performed by: PAIN MEDICINE

## 2017-09-27 PROCEDURE — 99204 OFFICE O/P NEW MOD 45 MIN: CPT | Mod: S$GLB,,, | Performed by: PAIN MEDICINE

## 2017-09-27 PROCEDURE — 1159F MED LIST DOCD IN RCRD: CPT | Mod: S$GLB,,, | Performed by: PAIN MEDICINE

## 2017-09-27 RX ORDER — SULFAMETHOXAZOLE AND TRIMETHOPRIM 400; 80 MG/1; MG/1
1 TABLET ORAL 2 TIMES DAILY
Qty: 14 TABLET | Refills: 0 | Status: SHIPPED | OUTPATIENT
Start: 2017-09-27 | End: 2018-01-02 | Stop reason: ALTCHOICE

## 2017-09-27 NOTE — TELEPHONE ENCOUNTER
----- Message from Sajan Espinal sent at 9/27/2017 10:24 AM CDT -----  Contact: [facility]Chucky  Called to report wounds pt has. Chucky states pt has wound on her breast (looks like open blister) Chucky states pt's son states he is using a hair dryer to dry her off after bathing, arm (red ring around wound) and on her nose-from C-PAP machine. Chucky can be reached @ [phone].

## 2017-09-27 NOTE — LETTER
September 27, 2017      Dre Kendall MD  [address] [facility]  [place] [address]           [facility]  [address] [facility]  [place] [address]  Phone: [phone]  Fax: [phone]          Patient: Tona Leone   MR Number: [id number]   YOB: 1927   Date of Visit: 9/27/2017       Dear Dr. Dre Kendall:    Thank you for referring Tona Leone to me for evaluation. Attached you will find relevant portions of my assessment and plan of care.    If you have questions, please do not hesitate to call me. I look forward to following Tona Leone along with you.    Sincerely,    Tim Paul Jr., MD    Enclosure  CC:  No Recipients    If you would like to receive this communication electronically, please contact [email or web address] or [phone] to request more information on [employer] Link access.    For providers and/or their staff who would like to refer a patient to [facility], please contact us through our one-stop-shop provider referral line, [facility], at [phone].    If you feel you have received this communication in error or would no longer like to receive these types of communications, please e-mail [email or web address]

## 2017-09-27 NOTE — PROGRESS NOTES
Subjective:     Patient ID: Tona Leone is a 89 y.o. female    Chief Complaint: Knee Pain (Left)      Referred by: Dre Kendall MD      HPI:    Initial Encounter (09/27/2017):  Tona Leone is a 89 y.o. female who presents today with chronic left knee pain. She also has chronic bilateral shoulder pain, but her knee pain is worse. The has undergone a right knee replacement in the past, but has declined right knee replacement. She has undergone left knee steroid injections without significant improvement. Her knee pain is worsened by movement and ambulation. She notes crepitus in the knee. Denies any swelling or erythema. She denies any focal weakness or numbness of the lower extremities. She denies any b/b dysfunction.    This pain is described in detail below.    She does has a rash/lesion to her left forearm. It has been there a long time. She is going to see a dermatologist soon.    Physical Therapy: Yes. Somewhat helpful, but not long lasting    Non-pharmacologic Treatment: Rest helps         · TENS? No    Pain Medications:         · Currently taking: Norco 5-325mg q12h PRN    · Has tried in the past:      · Has not tried: NSAIDs, Tylenol, Muscle relaxants, TCAs, SNRIs, anticonvulsants, topical creams    Blood thinners: Eliquis    Interventional Therapies: Previous left knee steroid injections. Helpful at first, but no longer beneficial    Relevant Surgeries: Right knee TKA    Affecting sleep? No    Affecting daily activities? yes    Depressive symptoms? no          · SI/HI? No    Work status: Unemployed    Pain Scores:    Best:       3/10  Worst:     9/10  Usually:   4/10  Today:    9/10    Review of Systems   Constitutional: Negative for activity change, appetite change, chills, fatigue, fever and unexpected weight change.   HENT: Negative for hearing loss.    Eyes: Negative for visual disturbance.   Respiratory: Negative for chest tightness and shortness of breath.    Cardiovascular: Negative for  chest pain.   Gastrointestinal: Negative for abdominal pain, constipation, diarrhea, nausea and vomiting.   Genitourinary: Negative for difficulty urinating.   Musculoskeletal: Positive for arthralgias. Negative for back pain, gait problem, joint swelling and neck pain.   Skin: Negative for rash.   Neurological: Negative for dizziness, weakness, light-headedness, numbness and headaches.   Psychiatric/Behavioral: Negative for hallucinations, sleep disturbance and suicidal ideas. The patient is not nervous/anxious.        Past Medical History:   Diagnosis Date    Arthritis     Atrial fibrillation 7/26/2012    Depressed 4/8/2014    GERD (gastroesophageal reflux disease)     Gout, arthritis     Gout, unspecified 4/8/2014    HTN (hypertension) 7/26/2012    Hyperlipemia 7/26/2012    Hyperlipidemia     Hypertension     Postmenopausal 4/8/2014    Vitamin D deficiency disease 4/8/2014       Past Surgical History:   Procedure Laterality Date    CATARACT EXTRACTION      ou       Social History     Social History    Marital status:      Spouse name: N/A    Number of children: N/A    Years of education: N/A     Occupational History    Not on file.     Social History Main Topics    Smoking status: Former Smoker    Smokeless tobacco: Never Used    Alcohol use No    Drug use: Unknown    Sexual activity: Not on file     Other Topics Concern    Not on file     Social History Narrative    No narrative on file       Review of patient's allergies indicates:   Allergen Reactions    Ciprocinonide     Niacin preparations     Pcn [penicillins]     Tetracyclic antidepressants        Current Outpatient Prescriptions on File Prior to Visit   Medication Sig Dispense Refill    albuterol 90 mcg/actuation inhaler Inhale 2 puffs into the lungs every 6 (six) hours as needed for Wheezing. Rescue 1 Inhaler 0    allopurinol (ZYLOPRIM) 300 MG tablet Take 1 tablet (300 mg total) by mouth once daily. 90 tablet 12     amlodipine (NORVASC) 10 MG tablet TAKE 1 TABLET (10 MG TOTAL) BY MOUTH ONCE DAILY. 90 tablet 3    apixaban 2.5 mg Tab Take 1 tablet (2.5 mg total) by mouth 2 (two) times daily. 60 tablet 11    aspirin 81 MG Chew Take 81 mg by mouth once daily.      atorvastatin (LIPITOR) 40 MG tablet TAKE 1 TABLET (40 MG TOTAL) BY MOUTH EVERY EVENING. 90 tablet 3    cholecalciferol, vitamin D3, (VITAMIN D3) 5,000 unit Tab Take 5,000 Units by mouth once daily.      cyanocobalamin (VITAMIN B-12) 1000 MCG tablet Take 100 mcg by mouth once daily.      desonide (DESOWEN) 0.05 % lotion Apply topically 2 (two) times daily. 118 mL 12    furosemide (LASIX) 20 MG tablet Take 20 mg by mouth 2 (two) times daily.      guaifenesin (MUCINEX) 600 mg 12 hr tablet Take 1,200 mg by mouth 2 (two) times daily.      hydrocodone-acetaminophen 5-325mg (NORCO) 5-325 mg per tablet Take 1 tablet by mouth every 12 (twelve) hours as needed for Pain. 60 tablet 0    losartan (COZAAR) 100 MG tablet Take 1 tablet (100 mg total) by mouth once daily. 90 tablet 3    metoprolol tartrate (LOPRESSOR) 25 MG tablet Take 1 tablet (25 mg total) by mouth 2 (two) times daily. 180 tablet 11    multivitamin (ONE DAILY MULTIVITAMIN) per tablet Take 1 tablet by mouth once daily.      oxybutynin (DITROPAN-XL) 5 MG TR24 Take 1 tablet (5 mg total) by mouth once daily. 30 tablet 11    pantoprazole (PROTONIX) 40 MG tablet Take 1 tablet (40 mg total) by mouth once daily. 90 tablet 4    paroxetine (PAXIL) 40 MG tablet Take 1 tablet (40 mg total) by mouth once daily. 90 tablet 4    polyethylene glycol (GLYCOLAX) 17 gram/dose powder       POLYETHYLENE GLYCOL 3350 (MIRALAX ORAL) Take by mouth.      potassium chloride SA (K-DUR,KLOR-CON) 20 MEQ tablet Take 1 tablet (20 mEq total) by mouth once daily. 90 tablet 4    propafenone (RHTHYMOL) 150 MG Tab Take 1 tablet (150 mg total) by mouth 2 (two) times daily. 180 tablet 3     No current facility-administered medications on  file prior to visit.        Objective:      /66 (BP Location: Right arm, Patient Position: Sitting)   Pulse 78   Resp 18   SpO2 99%     Exam:  GEN:  Well developed, well nourished.  No acute distress. O2 via nasal canula.  HEENT:  No trauma.  Mucous membranes moist.  Nares patent bilaterally.  PSYCH: Normal affect. Thought content appropriate.  CHEST:  Breathing symmetric.  No audible wheezing.  ABD: Soft, non-tender, non-distended.  SKIN:  Warm, pink, dry.  No rash on exposed areas.    EXT:  No cyanosis, clubbing, or edema.  No color change or changes in nail or hair growth. Large lesion/rash to left forearm.  NEURO/MUSCULOSKELETAL:  Fully alert, oriented, and appropriate. Speech normal lucy. No cranial nerve deficits.   Gait: Uses manual wheelchair for mobiltiy.  No focal motor deficits.   Left knee with mild crepitus. Full PROM. No joint laxity or instability noted. No warmth swelling or erythema noted.      Imaging:  Not pertinent imaging available at this time    Assessment:       Encounter Diagnoses   Name Primary?    Chronic right shoulder pain     Arthritis of left knee     Chronic pain of left knee Yes         Plan:       Tona was seen today for knee pain.    Diagnoses and all orders for this visit:    Chronic pain of left knee  -     X-Ray Knee 3 View Left; Future    Chronic right shoulder pain  -     X-Ray Knee 3 View Left; Future    Arthritis of left knee  -     X-Ray Knee 3 View Left; Future        Tona FARHANA Leone is a 89 y.o. female with chronic left knee pain 2/2 osteoarthritis.    1. Plan to perform left genicular nerve blocks under ultrasound. We will wait until dermatology evalution complete to ensure that no infection present.  2. Current dose of Nroco is relatively low and not causing severe side effects. Ok to continue.  3. Patient will call after dermatology appointment. I will review records and hopefully schedule for left genicular nerve blocks under ultrasound.  4. Left knee  xrays.

## 2017-09-27 NOTE — TELEPHONE ENCOUNTER
Spoke with Chucky and she states that the sore on the arm is red and looks infected, moist and bleeding. And one on the right breast and the son stated that they dry her with a blow dryer, but the area looks like a blister that has lost the top layer. She has cleaned them and dressed them both but concerned about the one on her arm.

## 2017-09-28 RX ORDER — PAROXETINE HYDROCHLORIDE 40 MG/1
40 TABLET, FILM COATED ORAL DAILY
Qty: 90 TABLET | Refills: 3 | Status: SHIPPED | OUTPATIENT
Start: 2017-09-28 | End: 2018-09-20 | Stop reason: SDUPTHER

## 2017-09-28 NOTE — TELEPHONE ENCOUNTER
Left message for Chucky SAMANIEGO nurse, Nory at home number notified and will go get the rx for the patient.

## 2017-09-28 NOTE — TELEPHONE ENCOUNTER
Left arm was not infected 2 days ago.   I did not see the breast issue. They did not address that with me.  Regardless I am glad to cover her with antibiotics for a week. Bactrim  sent. She has to see Derm as planned.

## 2017-10-03 ENCOUNTER — TELEPHONE (OUTPATIENT)
Dept: FAMILY MEDICINE | Facility: CLINIC | Age: 82
End: 2017-10-03

## 2017-10-03 NOTE — LETTER
October 3, 2017    Tona Leone  [address] [facility] [address] [facility] [address]             [facility]  [address] [facility] [address]  Phone: [phone]  Fax: [phone] Dear Ms. Leone:    Sorry we were unable to contact you to schedule your dermatology appointment. Please give the referral office a call to schedule. [phone].        If you have any questions or concerns, please don't hesitate to call.    Sincerely,        Kiana Rendon MA

## 2017-10-05 DIAGNOSIS — R52 PAIN: Primary | ICD-10-CM

## 2017-10-05 RX ORDER — HYDROCODONE BITARTRATE AND ACETAMINOPHEN 5; 325 MG/1; MG/1
1 TABLET ORAL EVERY 12 HOURS PRN
Qty: 60 TABLET | Refills: 0 | Status: SHIPPED | OUTPATIENT
Start: 2017-10-05 | End: 2017-11-03 | Stop reason: SDUPTHER

## 2017-10-05 NOTE — TELEPHONE ENCOUNTER
----- Message from Mackenzie Sevilla sent at 10/4/2017  2:31 PM CDT -----  Contact: Nory (daughter )  Pt daughter is requesting a refill on hydrocodone-acetaminophen 5-325mg (NORCO) 5-325 mg per tablet for the pt Thanks !

## 2017-10-06 RX ORDER — ALLOPURINOL 300 MG/1
300 TABLET ORAL DAILY
Qty: 90 TABLET | Refills: 3 | Status: SHIPPED | OUTPATIENT
Start: 2017-10-06 | End: 2018-10-20 | Stop reason: SDUPTHER

## 2017-10-10 ENCOUNTER — TELEPHONE (OUTPATIENT)
Dept: PAIN MEDICINE | Facility: CLINIC | Age: 82
End: 2017-10-10

## 2017-10-10 ENCOUNTER — TELEPHONE (OUTPATIENT)
Dept: FAMILY MEDICINE | Facility: CLINIC | Age: 82
End: 2017-10-10

## 2017-10-10 NOTE — TELEPHONE ENCOUNTER
Spoke with patients' daughter (Nory), she would like our office to request the most recent report from Dr. Hemal Perera.  I will contact Nory if unable to obtain records.

## 2017-10-10 NOTE — TELEPHONE ENCOUNTER
----- Message from Saul Scruggs sent at 10/10/2017 11:09 AM CDT -----  Contact: Silva Wolff NP called requesting a smaller oxygen tank  For pt (B Tank) w/ conserving device. Contact Mariella at [phone].    Thanks-

## 2017-10-10 NOTE — TELEPHONE ENCOUNTER
----- Message from Corinna Sosa sent at 10/9/2017  3:30 PM CDT -----  Contact: Daughter - Nory   Patient's daughter says she need the office to request the patient's report from Dr. Hemal Perera ( Dermatology). Please call her daughter  Nory at  [phone]

## 2017-10-12 ENCOUNTER — APPOINTMENT (OUTPATIENT)
Dept: RADIOLOGY | Facility: HOSPITAL | Age: 82
End: 2017-10-12
Attending: PAIN MEDICINE
Payer: MEDICARE

## 2017-10-12 DIAGNOSIS — G89.29 CHRONIC PAIN OF LEFT KNEE: ICD-10-CM

## 2017-10-12 DIAGNOSIS — G89.29 CHRONIC RIGHT SHOULDER PAIN: ICD-10-CM

## 2017-10-12 DIAGNOSIS — M17.12 ARTHRITIS OF LEFT KNEE: ICD-10-CM

## 2017-10-12 DIAGNOSIS — M25.511 CHRONIC RIGHT SHOULDER PAIN: ICD-10-CM

## 2017-10-12 DIAGNOSIS — M25.562 CHRONIC PAIN OF LEFT KNEE: ICD-10-CM

## 2017-10-12 PROCEDURE — 73562 X-RAY EXAM OF KNEE 3: CPT | Mod: 26,LT,, | Performed by: RADIOLOGY

## 2017-10-12 PROCEDURE — 73562 X-RAY EXAM OF KNEE 3: CPT | Mod: TC,PN,LT

## 2017-11-02 ENCOUNTER — PROCEDURE VISIT (OUTPATIENT)
Dept: PAIN MEDICINE | Facility: CLINIC | Age: 82
End: 2017-11-02
Payer: MEDICARE

## 2017-11-02 VITALS
SYSTOLIC BLOOD PRESSURE: 119 MMHG | TEMPERATURE: 97 F | OXYGEN SATURATION: 98 % | DIASTOLIC BLOOD PRESSURE: 67 MMHG | HEART RATE: 79 BPM | RESPIRATION RATE: 18 BRPM

## 2017-11-02 DIAGNOSIS — G89.29 CHRONIC PAIN OF LEFT KNEE: Primary | ICD-10-CM

## 2017-11-02 DIAGNOSIS — M17.12 ARTHRITIS OF LEFT KNEE: ICD-10-CM

## 2017-11-02 DIAGNOSIS — M25.562 CHRONIC PAIN OF LEFT KNEE: Primary | ICD-10-CM

## 2017-11-02 PROCEDURE — 64450 NJX AA&/STRD OTHER PN/BRANCH: CPT | Mod: LT,S$GLB,, | Performed by: PAIN MEDICINE

## 2017-11-02 NOTE — PROCEDURES
Procedures     Date of procedure: 11/02/2017    Procedure: Left genicular nerve blocks    Pre-op diagnosis: Left knee pain     Post-op diagnosis: Same     Physician: Dr. Tim Paul    Assistant: None    Anesthestia: local     EBL: None    Specimens: None    All medications, allergies, and relevant histories were reviewed. No recent antibiotics or infections. A time-out was taken to verify the correct patient, procedure, laterality, and appropriate medications/allergies.     Ultrasounded-guided Geniculate Nerve Blocks:    Informed consent obtained for the injection. The Left knee was prepped with chlor prep. Using US guidance, the lateral femoral condyle was identified. Under live US, a 27-gauge needle was advanced to the superior aspect of the lateral femoral condyle. After negative aspiration, 1 cc of 0.25% bupivacaine was injected. US showed good spread of local anesthestic. This was the repeated at the level of the suprapatellar region, the medial femoral condyle and the medial tibial condyle. The needle was removed and a Band-Aid placed over the entry point. No complications.     Future Management:   If good results, can proceed with cooled RFA.   Follow up via phone to tell me the results.

## 2017-11-03 ENCOUNTER — TELEPHONE (OUTPATIENT)
Dept: PAIN MEDICINE | Facility: CLINIC | Age: 82
End: 2017-11-03

## 2017-11-03 DIAGNOSIS — R52 PAIN: ICD-10-CM

## 2017-11-03 RX ORDER — HYDROCODONE BITARTRATE AND ACETAMINOPHEN 5; 325 MG/1; MG/1
1 TABLET ORAL EVERY 12 HOURS PRN
Qty: 60 TABLET | Refills: 0 | Status: SHIPPED | OUTPATIENT
Start: 2017-11-03 | End: 2017-12-04 | Stop reason: SDUPTHER

## 2017-11-03 NOTE — TELEPHONE ENCOUNTER
Daughter Nory called to report no significant relief following left genicular nerve block performed in clinic yesterday (11/2/17).  She did inquire about options for right shoulder pain management.  Dr. Paul updated, encouraged pt to come to clinic to discuss options for further treatment.  Daughter stated she will talk with Ms. Carbone prior to scheduling an additional appointment.

## 2017-11-03 NOTE — TELEPHONE ENCOUNTER
----- Message from Sajan Espinal sent at 11/2/2017  2:51 PM CDT -----  Contact: Daughter-Nory/[phone]  REFILL: hydrocodone-acetaminophen 5-325mg (NORCO) 5-325 mg per tablet    PHARMACY: [facility]

## 2017-11-16 ENCOUNTER — TELEPHONE (OUTPATIENT)
Dept: FAMILY MEDICINE | Facility: CLINIC | Age: 82
End: 2017-11-16

## 2017-11-16 DIAGNOSIS — I48.0 PAROXYSMAL ATRIAL FIBRILLATION: Primary | ICD-10-CM

## 2017-11-16 NOTE — TELEPHONE ENCOUNTER
----- Message from Becca Pacheco sent at 11/16/2017  8:43 AM CST -----  Contact: jo ann Cueto   [phone]  Jo Ann Cueto is requesting to speak to you regarding her mother will be have a removal of skin cancer on her arm and she needs your ok to be taken off a medicine, Pls call jo ann Cueto [phone],. Thanks.........Mendy

## 2017-11-16 NOTE — TELEPHONE ENCOUNTER
----- Message from Corinna Sosa sent at 11/16/2017  3:09 PM CST -----  Contact: Marie- [facility]  Marie with Dr. Olivier's office would like to know if the patient can get off of her Lovanox and get  a EKG for surgery. Please call at  [phone] Fax: [phone]

## 2017-11-16 NOTE — TELEPHONE ENCOUNTER
The request is her anticoagulant for her atrial fibrillation.  It really should be managed by her cardiologist.  Looks like she saw Dr. Logan about a year ago but may have been managed by the heart clinic prior.    Refer to Other phone message for plastic surgery information, we do need to call the plastic surgeon's office and find out how many days the plastic surgeon would eat her off of the anticoagulant.        Please also ask if the procedure will be done at [facility]        May need to pass message on to Dr. Logan about exactly when elliquis should be held.    Okay to obtain EKG as requested.  I can put in an order

## 2017-11-16 NOTE — TELEPHONE ENCOUNTER
Chanelquist, will need to be stopped prior to procedure. How long should she be off of this prior to this procedure. According to the daughter she will have it removed and then a skin graph will take it's place.

## 2017-11-17 ENCOUNTER — TELEPHONE (OUTPATIENT)
Dept: FAMILY MEDICINE | Facility: CLINIC | Age: 82
End: 2017-11-17

## 2017-11-17 NOTE — TELEPHONE ENCOUNTER
----- Message from Kellie Sosa sent at 11/17/2017 12:49 PM CST -----  Marie with Dr. Olivier's office is returning Ms Reddy's call regarding if the patient can get off of her Lovanox for 3 days before surgery. Please call at [phone] thank you!

## 2017-11-17 NOTE — TELEPHONE ENCOUNTER
[phone], daughters phone number, she was given this message and also will have the EKG at cardiologist office visit week after Thanksgiving.

## 2017-11-20 NOTE — TELEPHONE ENCOUNTER
Patient has an appt with Dr. Ma at  11/29 and will have the EKG done at that appt. Called and they will address the eliquis at her appt.

## 2017-11-29 ENCOUNTER — TELEPHONE (OUTPATIENT)
Dept: FAMILY MEDICINE | Facility: CLINIC | Age: 82
End: 2017-11-29

## 2017-11-29 ENCOUNTER — LAB VISIT (OUTPATIENT)
Dept: LAB | Facility: HOSPITAL | Age: 82
End: 2017-11-29
Attending: INTERNAL MEDICINE
Payer: MEDICARE

## 2017-11-29 DIAGNOSIS — N39.0 URINARY TRACT INFECTION WITHOUT HEMATURIA, SITE UNSPECIFIED: Primary | ICD-10-CM

## 2017-11-29 DIAGNOSIS — N39.0 URINARY TRACT INFECTION WITHOUT HEMATURIA, SITE UNSPECIFIED: ICD-10-CM

## 2017-11-29 LAB
BACTERIA #/AREA URNS AUTO: ABNORMAL /HPF
BILIRUB UR QL STRIP: NEGATIVE
CLARITY UR REFRACT.AUTO: ABNORMAL
COLOR UR AUTO: YELLOW
GLUCOSE UR QL STRIP: NEGATIVE
HGB UR QL STRIP: ABNORMAL
HYALINE CASTS UR QL AUTO: 0 /LPF
KETONES UR QL STRIP: NEGATIVE
LEUKOCYTE ESTERASE UR QL STRIP: ABNORMAL
MICROSCOPIC COMMENT: ABNORMAL
NITRITE UR QL STRIP: POSITIVE
PH UR STRIP: 6 [PH] (ref 5–8)
PROT UR QL STRIP: ABNORMAL
RBC #/AREA URNS AUTO: 12 /HPF (ref 0–4)
SP GR UR STRIP: 1.01 (ref 1–1.03)
URN SPEC COLLECT METH UR: ABNORMAL
UROBILINOGEN UR STRIP-ACNC: NEGATIVE EU/DL
WBC #/AREA URNS AUTO: >100 /HPF (ref 0–5)
WBC CLUMPS UR QL AUTO: ABNORMAL

## 2017-11-29 PROCEDURE — 87077 CULTURE AEROBIC IDENTIFY: CPT

## 2017-11-29 PROCEDURE — 87086 URINE CULTURE/COLONY COUNT: CPT

## 2017-11-29 PROCEDURE — 87186 SC STD MICRODIL/AGAR DIL: CPT

## 2017-11-29 PROCEDURE — 87088 URINE BACTERIA CULTURE: CPT

## 2017-11-29 PROCEDURE — 81001 URINALYSIS AUTO W/SCOPE: CPT

## 2017-11-29 NOTE — TELEPHONE ENCOUNTER
Spoke with the patient's daughter, Nory.  Daughter requesting if she can had an order placed for a urinalysis for her mother.  Will schedule a lab appointment for urine collection, if ordered.  States that the patient's urine has been cloudy and has had vaginal itching.  Daughter would like the patient's urine tested and resulted in lab before she schedules an office visit.  Please advise.

## 2017-11-29 NOTE — TELEPHONE ENCOUNTER
----- Message from Radha Amaya sent at 11/28/2017 10:45 AM CST -----  Contact: daughter - Nory  Patient's daughter is requesting to drop off a urine specimen for culture . Her urine is cloudy & she has vaginal itching .    [phone]     LL

## 2017-11-29 NOTE — TELEPHONE ENCOUNTER
Spoke with the patient's daughter.  Will provide sample today in the lab for UA with Culture.  Denies fever or chills.  Denies any mental status changes.

## 2017-11-30 ENCOUNTER — PATIENT MESSAGE (OUTPATIENT)
Dept: FAMILY MEDICINE | Facility: CLINIC | Age: 82
End: 2017-11-30

## 2017-11-30 RX ORDER — NITROFURANTOIN 25; 75 MG/1; MG/1
100 CAPSULE ORAL 2 TIMES DAILY
Qty: 10 CAPSULE | Refills: 0 | Status: SHIPPED | OUTPATIENT
Start: 2017-11-30 | End: 2017-12-05

## 2017-12-01 LAB — BACTERIA UR CULT: NORMAL

## 2017-12-04 ENCOUNTER — TELEPHONE (OUTPATIENT)
Dept: FAMILY MEDICINE | Facility: CLINIC | Age: 82
End: 2017-12-04

## 2017-12-04 DIAGNOSIS — R52 PAIN: ICD-10-CM

## 2017-12-04 NOTE — TELEPHONE ENCOUNTER
Patient daughter Nory is requesting medication refill on pain medication (Norco) 5-325mg. Last refill was on 11/03/17 medication is reorder it's pending.

## 2017-12-04 NOTE — TELEPHONE ENCOUNTER
----- Message from Jillian White sent at 11/30/2017  1:15 PM CST -----  Contact: Nory- Daughter  Please call patient daughter with urine result. Ms. Nory Leone can be reached [phone].      Thanks,

## 2017-12-04 NOTE — TELEPHONE ENCOUNTER
Returned patient daughter Nory Leone phone call to inform her that mother urine test was positive.

## 2017-12-05 RX ORDER — HYDROCODONE BITARTRATE AND ACETAMINOPHEN 5; 325 MG/1; MG/1
1 TABLET ORAL EVERY 12 HOURS PRN
Qty: 60 TABLET | Refills: 0 | Status: ON HOLD | OUTPATIENT
Start: 2017-12-05 | End: 2018-01-11 | Stop reason: HOSPADM

## 2017-12-05 RX ORDER — HYDROCODONE BITARTRATE AND ACETAMINOPHEN 5; 325 MG/1; MG/1
1 TABLET ORAL EVERY 12 HOURS PRN
Qty: 60 TABLET | Refills: 0 | Status: SHIPPED | OUTPATIENT
Start: 2018-01-05 | End: 2018-02-08 | Stop reason: SDUPTHER

## 2017-12-05 NOTE — TELEPHONE ENCOUNTER
Prescription printed for both December and for January to get through the holidays.  Since last seen in the September, probably good to make a follow-up appointment in February BEFORE the next prescriptions are due

## 2017-12-06 NOTE — TELEPHONE ENCOUNTER
Called to inform patient daughter Nory Leone that mother RX for (Norco) 5-325mg is ready for pick-up.

## 2018-01-02 ENCOUNTER — HOSPITAL ENCOUNTER (OUTPATIENT)
Dept: PREADMISSION TESTING | Facility: HOSPITAL | Age: 83
Discharge: HOME | End: 2018-01-02
Attending: PLASTIC SURGERY
Payer: MEDICARE

## 2018-01-02 VITALS
HEIGHT: 68 IN | WEIGHT: 192 LBS | RESPIRATION RATE: 18 BRPM | SYSTOLIC BLOOD PRESSURE: 114 MMHG | BODY MASS INDEX: 29.1 KG/M2 | OXYGEN SATURATION: 97 % | TEMPERATURE: 97 F | HEART RATE: 70 BPM | DIASTOLIC BLOOD PRESSURE: 54 MMHG

## 2018-01-02 DIAGNOSIS — Z01.818 PRE-OP TESTING: Primary | ICD-10-CM

## 2018-01-02 LAB
ANION GAP SERPL CALC-SCNC: 10 MMOL/L
BASOPHILS # BLD AUTO: 0.05 K/UL
BASOPHILS NFR BLD: 0.6 %
BUN SERPL-MCNC: 20 MG/DL
CALCIUM SERPL-MCNC: 10.2 MG/DL
CHLORIDE SERPL-SCNC: 105 MMOL/L
CO2 SERPL-SCNC: 25 MMOL/L
CREAT SERPL-MCNC: 1 MG/DL
DIFFERENTIAL METHOD: ABNORMAL
EOSINOPHIL # BLD AUTO: 0.5 K/UL
EOSINOPHIL NFR BLD: 6 %
ERYTHROCYTE [DISTWIDTH] IN BLOOD BY AUTOMATED COUNT: 16.4 %
EST. GFR  (AFRICAN AMERICAN): 57 ML/MIN/1.73 M^2
EST. GFR  (NON AFRICAN AMERICAN): 50 ML/MIN/1.73 M^2
GLUCOSE SERPL-MCNC: 86 MG/DL
HCT VFR BLD AUTO: 33.8 %
HGB BLD-MCNC: 10.3 G/DL
LYMPHOCYTES # BLD AUTO: 3.1 K/UL
LYMPHOCYTES NFR BLD: 35.5 %
MCH RBC QN AUTO: 28.5 PG
MCHC RBC AUTO-ENTMCNC: 30.5 G/DL
MCV RBC AUTO: 94 FL
MONOCYTES # BLD AUTO: 1 K/UL
MONOCYTES NFR BLD: 11.3 %
NEUTROPHILS # BLD AUTO: 4 K/UL
NEUTROPHILS NFR BLD: 46.5 %
PLATELET # BLD AUTO: 210 K/UL
PMV BLD AUTO: 9.7 FL
POTASSIUM SERPL-SCNC: 4.8 MMOL/L
RBC # BLD AUTO: 3.61 M/UL
SODIUM SERPL-SCNC: 140 MMOL/L
WBC # BLD AUTO: 8.66 K/UL

## 2018-01-02 PROCEDURE — 85025 COMPLETE CBC W/AUTO DIFF WBC: CPT

## 2018-01-02 PROCEDURE — 80048 BASIC METABOLIC PNL TOTAL CA: CPT

## 2018-01-02 NOTE — DISCHARGE INSTRUCTIONS
"  Your surgery is scheduled for __Thursday Jan. 11, 2018__________________.    Call [phone] between 2 p.m. and 5 p.m. on   _Wednesday______________ to find out your arrival time for the day of your surgery.      Please report to SAME DAY SURGERY UNIT on the 2nd FLOOR at _______ a.m.  Use front door entrance. The doors open at 0530 am.      If you need WHEELCHAIR assistance please call  [phone] from your cell phone or "[phone]"  from the  hospital courtesy phone located to the right after you enter the hospital lobby.      INSTRUCTIONS IMPORTANT!!!  ¨ Do not eat or drink after 12 midnight-including water. OK to brush teeth, no   gum, candy or mints!    ¨ Take only these medicines with a small swallow of water-morning of surgery.  Take Amlodipine, Metoprolol and Paroxetine woth swallow of water morning of surgery.             __x__  Prep instructions   SHOWER       _x___  Please shower using Hibiclens soap the night before AND  the morning of x your surgery/procedure. Do not use Hibiclens on your face or genitals                 Rinse hibiclens off completely.    _x___  No shaving of procedural area at least 4-5 days before surgery due to  increased risk of skin irritation and/or possible infection.      _x__  Do not wear makeup, including mascara. WEARING EYE MAKEUP MAY  LEAD TO SERIOUS EYE INJURY during surgery.    _x___  No powder, lotions or creams to your body.    _x___  You may wear only deodorant on the day of surgery.    _x___  Please remove all jewelry, including piercings and leave at home.    _x___  No money or valuables needed. Please leave at home.  You may bring your  cell phone.      _x___  If going home the same day, arrange for a ride home. You will not be able to drive if Anesthesia was used.    _x___  Wear loose fitting clothing. Allow for dressings, bandages.    _x___  Stop Aspirin, Ibuprofen, Motrin and Aleve at least 3-5 days before  surgery, unless otherwise instructed by your doctor, or the nurse.  "             You MAY use Tylenol/acetaminophen until day of surgery.    _x___  Call MD for temperature above 101 degrees.          _x___ Stop taking any Fish Oil supplement or any Vitamins that contain Vitamin   E at least 5 days prior to surgery.            I have read or had read and explained to me, and understand the above information.  Additional comments or instructions:Please call   [phone] if you have any questions regarding the instructions above.

## 2018-01-11 ENCOUNTER — HOSPITAL ENCOUNTER (OUTPATIENT)
Facility: HOSPITAL | Age: 83
Discharge: HOME | End: 2018-01-11
Attending: PLASTIC SURGERY | Admitting: PLASTIC SURGERY
Payer: MEDICARE

## 2018-01-11 ENCOUNTER — ANESTHESIA EVENT (OUTPATIENT)
Dept: SURGERY | Facility: HOSPITAL | Age: 83
End: 2018-01-11
Payer: MEDICARE

## 2018-01-11 ENCOUNTER — ANESTHESIA (OUTPATIENT)
Dept: SURGERY | Facility: HOSPITAL | Age: 83
End: 2018-01-11
Payer: MEDICARE

## 2018-01-11 VITALS
SYSTOLIC BLOOD PRESSURE: 130 MMHG | RESPIRATION RATE: 18 BRPM | DIASTOLIC BLOOD PRESSURE: 65 MMHG | HEART RATE: 56 BPM | BODY MASS INDEX: 29.1 KG/M2 | HEIGHT: 68 IN | TEMPERATURE: 99 F | OXYGEN SATURATION: 98 % | WEIGHT: 192 LBS

## 2018-01-11 DIAGNOSIS — L98.9 LESION OF SUBCUTANEOUS TISSUE: Primary | ICD-10-CM

## 2018-01-11 PROBLEM — G47.33 OSA TREATED WITH BIPAP: Status: ACTIVE | Noted: 2018-01-11

## 2018-01-11 PROBLEM — I50.9 CONGESTIVE HEART FAILURE (CHF): Status: ACTIVE | Noted: 2018-01-11

## 2018-01-11 PROCEDURE — 37000009 HC ANESTHESIA EA ADD 15 MINS: Performed by: PLASTIC SURGERY

## 2018-01-11 PROCEDURE — 25000003 PHARM REV CODE 250: Performed by: PLASTIC SURGERY

## 2018-01-11 PROCEDURE — 63600175 PHARM REV CODE 636 W HCPCS: Performed by: NURSE ANESTHETIST, CERTIFIED REGISTERED

## 2018-01-11 PROCEDURE — 36000707: Performed by: PLASTIC SURGERY

## 2018-01-11 PROCEDURE — 88331 PATH CONSLTJ SURG 1 BLK 1SPC: CPT | Mod: 26,,, | Performed by: PATHOLOGY

## 2018-01-11 PROCEDURE — D9220A PRA ANESTHESIA: Mod: CRNA,,, | Performed by: NURSE ANESTHETIST, CERTIFIED REGISTERED

## 2018-01-11 PROCEDURE — 71000015 HC POSTOP RECOV 1ST HR: Performed by: PLASTIC SURGERY

## 2018-01-11 PROCEDURE — D9220A PRA ANESTHESIA: Mod: ANES,,, | Performed by: ANESTHESIOLOGY

## 2018-01-11 PROCEDURE — 71000033 HC RECOVERY, INTIAL HOUR: Performed by: PLASTIC SURGERY

## 2018-01-11 PROCEDURE — 37000008 HC ANESTHESIA 1ST 15 MINUTES: Performed by: PLASTIC SURGERY

## 2018-01-11 PROCEDURE — 88305 TISSUE EXAM BY PATHOLOGIST: CPT | Performed by: PATHOLOGY

## 2018-01-11 PROCEDURE — S0020 INJECTION, BUPIVICAINE HYDRO: HCPCS | Performed by: PLASTIC SURGERY

## 2018-01-11 PROCEDURE — S0077 INJECTION, CLINDAMYCIN PHOSP: HCPCS | Performed by: PLASTIC SURGERY

## 2018-01-11 PROCEDURE — 25000003 PHARM REV CODE 250: Performed by: NURSE ANESTHETIST, CERTIFIED REGISTERED

## 2018-01-11 PROCEDURE — 36000706: Performed by: PLASTIC SURGERY

## 2018-01-11 PROCEDURE — 88305 TISSUE EXAM BY PATHOLOGIST: CPT | Mod: 26,,, | Performed by: PATHOLOGY

## 2018-01-11 RX ORDER — PHENYLEPHRINE HYDROCHLORIDE 10 MG/ML
INJECTION INTRAVENOUS
Status: DISCONTINUED | OUTPATIENT
Start: 2018-01-11 | End: 2018-01-11

## 2018-01-11 RX ORDER — METOCLOPRAMIDE HYDROCHLORIDE 5 MG/ML
10 INJECTION INTRAMUSCULAR; INTRAVENOUS EVERY 10 MIN PRN
Status: DISCONTINUED | OUTPATIENT
Start: 2018-01-11 | End: 2018-01-11 | Stop reason: HOSPADM

## 2018-01-11 RX ORDER — CLINDAMYCIN PHOSPHATE 600 MG/50ML
600 INJECTION, SOLUTION INTRAVENOUS
Status: COMPLETED | OUTPATIENT
Start: 2018-01-11 | End: 2018-01-11

## 2018-01-11 RX ORDER — LIDOCAINE HCL/PF 100 MG/5ML
SYRINGE (ML) INTRAVENOUS
Status: DISCONTINUED | OUTPATIENT
Start: 2018-01-11 | End: 2018-01-11

## 2018-01-11 RX ORDER — LIDOCAINE HCL/EPINEPHRINE/PF 2%-1:200K
VIAL (ML) INJECTION
Status: DISCONTINUED | OUTPATIENT
Start: 2018-01-11 | End: 2018-01-11 | Stop reason: HOSPADM

## 2018-01-11 RX ORDER — HYDROMORPHONE HYDROCHLORIDE 2 MG/ML
0.2 INJECTION, SOLUTION INTRAMUSCULAR; INTRAVENOUS; SUBCUTANEOUS EVERY 5 MIN PRN
Status: DISCONTINUED | OUTPATIENT
Start: 2018-01-11 | End: 2018-01-11 | Stop reason: HOSPADM

## 2018-01-11 RX ORDER — DIPHENHYDRAMINE HYDROCHLORIDE 50 MG/ML
25 INJECTION INTRAMUSCULAR; INTRAVENOUS EVERY 6 HOURS PRN
Status: DISCONTINUED | OUTPATIENT
Start: 2018-01-11 | End: 2018-01-11 | Stop reason: HOSPADM

## 2018-01-11 RX ORDER — BUPIVACAINE HYDROCHLORIDE 5 MG/ML
INJECTION, SOLUTION EPIDURAL; INTRACAUDAL
Status: DISCONTINUED | OUTPATIENT
Start: 2018-01-11 | End: 2018-01-11 | Stop reason: HOSPADM

## 2018-01-11 RX ORDER — SODIUM CHLORIDE, SODIUM LACTATE, POTASSIUM CHLORIDE, CALCIUM CHLORIDE 600; 310; 30; 20 MG/100ML; MG/100ML; MG/100ML; MG/100ML
INJECTION, SOLUTION INTRAVENOUS CONTINUOUS PRN
Status: DISCONTINUED | OUTPATIENT
Start: 2018-01-11 | End: 2018-01-11

## 2018-01-11 RX ORDER — MEPERIDINE HYDROCHLORIDE 50 MG/ML
12.5 INJECTION INTRAMUSCULAR; INTRAVENOUS; SUBCUTANEOUS ONCE AS NEEDED
Status: DISCONTINUED | OUTPATIENT
Start: 2018-01-11 | End: 2018-01-11 | Stop reason: HOSPADM

## 2018-01-11 RX ORDER — PROPOFOL 10 MG/ML
VIAL (ML) INTRAVENOUS CONTINUOUS PRN
Status: DISCONTINUED | OUTPATIENT
Start: 2018-01-11 | End: 2018-01-11

## 2018-01-11 RX ORDER — SODIUM CHLORIDE 0.9 % (FLUSH) 0.9 %
3 SYRINGE (ML) INJECTION
Status: DISCONTINUED | OUTPATIENT
Start: 2018-01-11 | End: 2018-01-11 | Stop reason: HOSPADM

## 2018-01-11 RX ORDER — ACETAMINOPHEN 10 MG/ML
1000 INJECTION, SOLUTION INTRAVENOUS ONCE
Status: DISCONTINUED | OUTPATIENT
Start: 2018-01-11 | End: 2018-01-11 | Stop reason: HOSPADM

## 2018-01-11 RX ADMIN — SODIUM CHLORIDE, SODIUM LACTATE, POTASSIUM CHLORIDE, AND CALCIUM CHLORIDE: .6; .31; .03; .02 INJECTION, SOLUTION INTRAVENOUS at 07:01

## 2018-01-11 RX ADMIN — PHENYLEPHRINE HYDROCHLORIDE 200 MCG: 10 INJECTION INTRAVENOUS at 08:01

## 2018-01-11 RX ADMIN — PHENYLEPHRINE HYDROCHLORIDE 200 MCG: 10 INJECTION INTRAVENOUS at 09:01

## 2018-01-11 RX ADMIN — PROPOFOL 75 MCG/KG/MIN: 10 INJECTION, EMULSION INTRAVENOUS at 08:01

## 2018-01-11 RX ADMIN — CLINDAMYCIN PHOSPHATE 600 MG: 12 INJECTION, SOLUTION INTRAVENOUS at 07:01

## 2018-01-11 RX ADMIN — LIDOCAINE HYDROCHLORIDE 100 MG: 20 INJECTION, SOLUTION INTRAVENOUS at 08:01

## 2018-01-11 NOTE — ANESTHESIA PREPROCEDURE EVALUATION
01/11/2018  Tona Leone is a 89 y.o., female.    Anesthesia Evaluation    I have reviewed the Patient Summary Reports.    I have reviewed the Nursing Notes.   I have reviewed the Medications.     Review of Systems  Anesthesia Hx:  No problems with previous Anesthesia Denies Hx of Anesthetic complications  Neg history of prior surgery. Denies Family Hx of Anesthesia complications.   Denies Personal Hx of Anesthesia complications.   Social:  Non-Smoker, No Alcohol Use    Hematology/Oncology:  Hematology Normal   Oncology Normal     EENT/Dental:EENT/Dental Normal   Cardiovascular:   Exercise tolerance: good Hypertension CHF hyperlipidemia    Pulmonary:   Sleep Apnea Sleeps with bipap  On 2L O2   Renal/:  Renal/ Normal     Hepatic/GI:   GERD    Musculoskeletal:  Musculoskeletal Normal    Neurological:  Neurology Normal    Endocrine:  Endocrine Normal    Dermatological:  Skin Normal    Psych:   anxiety          Physical Exam  General:  Well nourished    Airway/Jaw/Neck:  Airway Findings: Mouth Opening: Normal General Airway Assessment: Adult  Mallampati: II  TM Distance: Normal, at least 6 cm         Dental:  DENTAL FINDINGS: Normal   Chest/Lungs:  Chest/Lungs Clear    Heart/Vascular:  Heart Findings: Normal Heart murmur: negative       Mental Status:  Mental Status Findings:  Cooperative, Alert and Oriented         Anesthesia Plan  Type of Anesthesia, risks & benefits discussed:  Anesthesia Type:  general, MAC  Patient's Preference:   Intra-op Monitoring Plan:   Intra-op Monitoring Plan Comments:   Post Op Pain Control Plan:   Post Op Pain Control Plan Comments:   Induction:   IV  Beta Blocker:  Patient is not currently on a Beta-Blocker (No further documentation required).       Informed Consent: Patient understands risks and agrees with Anesthesia plan.  Questions answered. Anesthesia consent signed with  patient.  ASA Score: 3     Day of Surgery Review of History & Physical:        Anesthesia Plan Notes: Seen by Dr. Hernandez pre operatively who cleared pt at mild increased risk.          Ready For Surgery From Anesthesia Perspective.

## 2018-01-11 NOTE — INTERVAL H&P NOTE
The patient has been examined and the H&P has been reviewed:    I concur with the findings and no changes have occurred since H&P was written.    Anesthesia/Surgery risks, benefits and alternative options discussed and understood by patient/family.          Active Hospital Problems    Diagnosis  POA    Lesion of subcutaneous tissue [L98.9]  Yes      Resolved Hospital Problems    Diagnosis Date Resolved POA   No resolved problems to display.

## 2018-01-11 NOTE — DISCHARGE SUMMARY
Plastic Surgery Discharge Summary    Admission date: 1/11/2018  5:46 AM  Discharge date: 1/11/18    Admission Dx: left forearm SCC  Discharge Dx: same    Attending: Soy  Discharge MD: Baljinder    Hospital Course:  Patient underwent excision with FTSG to left forearm with bolster for which she tolerated well without complications. She did well postoperatively and was discharged home with f/u for next week.    Procedure: as noted above.    PE:  Bolster dressing intact  L groin incision c/d/i    Discharge instructions:  -Do not drive or operate heavy machinery when taking narcotic pain medicine as it can cause drowsiness.  -Do not get left upper extremity dressing wet, keep dry at all times. Leave dressing on at all times until follow-up clinic visit  -Resume all home medications.  -No strenuous activity or heavy lifting (nothing over 5 lbs)  -Do not get left groin dressing wet for 48 hours.   -Prefer sponge baths. Do not submerge incisions in bath or pool. Make sure to keep dressing dry at all times.  -Wear sling until follow-up.  -Please call Dr Olivier's office to schedule post-op appointment for 1 pieter Gale  [facility] PGY4  Pager [phone]

## 2018-01-11 NOTE — DISCHARGE INSTRUCTIONS
-Do not drive or operate heavy machinery when taking narcotic pain medicine as it can cause drowsiness.  -Do not get left upper extremity dressing wet, keep dry at all times. Leave dressing on at all times until follow-up clinic visit  -Resume all home medications.  -No strenuous activity or heavy lifting (nothing over 5 lbs)  -Do not get left groin dressing wet for 48 hours.   -Prefer sponge baths. Do not submerge incisions in bath or pool. Make sure to keep dressing dry at all times.  -Wear sling until follow-up.  -Please call Dr Olivier's office to schedule post-op appointment for 1 week.      DIET: You may resume your home diet. If nausea is present, increase your diet gradually with fluids and bland foods    ACTIVITY LEVEL: You have received sedation or an anesthetic, you may feel sleepy for several hours. Rest until you are more awake. Gradually resume your normal activities    Medications: Pain medication should be taken only if needed and as directed. If antibiotics are prescribed, the medication should be taken until completed. You will be given an updated list of you medications.    No driving, alcoholic beverages or signing legal documents for next 24 hours or while taking pain medication.       CALL THE DOCTOR:    For any obvious bleeding (some dried blood over the incision is normal).      Redness, swelling, foul smell around incision or fever over 101.   Shortness of breath, Coughing up Bloody sputum, Pains or Swelling in your Calves .   Persistent pain or nausea not relieved by medication.    If any unusual problems or difficulties occur contact your doctor. If you cannot contact your doctor but feel your signs and symptoms warrant a physicians attention return to the emergency room.         Fall Prevention  Millions of people fall every year and injure themselves. You may have had anesthesia or sedation which may increase your risk of falling. You may have health issues that put you at an  increased risk of falling.     Here are ways to reduce your risk of falling.  ·   · Make your home safe by keeping walkways clear of objects you may trip over.  · Use non-slip pads under rugs. Do not use area rugs or small throw rugs.  · Use non-slip mats in bathtubs and showers.  · Install handrails and lights on staircases.  · Do not walk in poorly lit areas.  · Do not stand on chairs or wobbly ladders.  · Use caution when reaching overhead or looking upward. This position can cause a loss of balance.  · Be sure your shoes fit properly, have non-slip bottoms and are in good condition.   · Wear shoes both inside and out. Avoid going barefoot or wearing slippers.  · Be cautious when going up and down stairs, curbs, and when walking on uneven sidewalks.  · If your balance is poor, consider using a cane or walker.  · If your fall was related to alcohol use, stop or limit alcohol intake.   · If your fall was related to use of sleeping medicines, talk to your doctor about this. You may need to reduce your dosage at bedtime if you awaken during the night to go to the bathroom.    · To reduce the need for nighttime bathroom trips:  ¨ Avoid drinking fluids for several hours before going to bed  ¨ Empty your bladder before going to bed  ¨ Men can keep a urinal at the bedside  · Stay as active as you can. Balance, flexibility, strength, and endurance all come from exercise. They all play a role in preventing falls. Ask your healthcare provider which types of activity are right for you.  · Get your vision checked on a regular basis.  · If you have pets, know where they are before you stand up or walk so you don't trip over them.  Use night lights.

## 2018-01-11 NOTE — BRIEF OP NOTE
Plastic Surgery Brief Op Note    Pre-op Dx: SCC of L forearm  Post-op Dx: same  Procedure:   1. Excision of SCC of L forearm with frozen sections (neg margins)  2. FTSG (from L groin) to L forearm wound with tramainester    Attending: Soy  Assistant: Baljinder    Anesthesia: Conscious sedation with local  EBL: less than 50 cc  UOP: see anesthesia note  IVF: see anesthesia note    Findings: see dictation  Drains: none  Implants: none  Specimen: SCC sent for final path  Complications: none    Disposition: PACU in stable condition    Rudy Gale  [employer] Plastic Surgery PGY4  Pager [phone]

## 2018-01-11 NOTE — OP NOTE
Plastic Surgery Operative Note    Pre-op Dx: SCC of L forearm  Post-op Dx: same  Procedure:   1. Excision of SCC of Left forearm (3.5x3.5 cm)with frozen sections (neg margins)  2. FTSG (from L groin, measuring 12x5 cm) to L forearm wound with tramainestdawson     Attending: Soy  Assistant: Baljinder     Anesthesia: Conscious sedation with local  EBL: less than 50 cc  UOP: see anesthesia note  IVF: see anesthesia note     Drains: none  Implants: none  Specimen: SCC sent for final path  Complications: none     Disposition: PACU in stable condition    Procedure in detail:  Patient was brought to the operating room, transferred to the operating table in supine fashion. After undergoing conscious sedation, a time-out was performed at which point all patient identifiers were deemed to be correct. The patient's left arm was prepped and draped in the normal sterile fashion. We marked a 6 mm margin around the exophytic, ulcerated lesion. We then infiltrated the excision area with 10 cc of 1% lidocaine with epi. We then proceeded with excising the margins in a step-wise fashion, by quarters. We marked each quarter with a marking stitch and marked the true edge with marking pen. After we completed the excision of the margins, we then proceeded to excise the lesion extending deep to the fascia. The lesion was marked at the 12 o clock position. All specimens were sent to pathology for frozen sections. We then obtained hemostasis of the wound using electrocautery. We then directed our attention to the left groin which was prepped and draped initially. We measured the left forearm defect to be 6x6 cm and marked out 12 x 5 cm skin graft in the left groin. We then infiltrated the donor site with 10cc of 1% lidocaine with epi. After which, the full-thickness skin graft was harvested with a blade. Hemostasis was achieved using electrocautery. We then proceeded to close the groin site with 3-0 vicryl for the deep dermis and 2-0 quill for the skin.  We then dressed the wound with prineo closure system. The frozen sections were negative for carcinoma. We then defatted the FTSG and inset and trimmed it to fit the left forearm wound. The FTSG was pie-crusted and inset with 3-0 vicryl sutures. After which, we placed a bolster dressing of xeroform, and mineral oil soaked cotton swabs. We then dressed the recipient site with gauze, cotton roll,and a splint followed by an ace-wrap. She was then placed in a sling and swath. Patient tolerated the procedure well without complications and was taken to the PACu in stable condition. At the end of the case, all sponge,k needle, and instrument counts were correct. Dr Olivier was present for the entirety of the case.     Rudy Gale  [facility] PGY4  Pager [phone]

## 2018-01-11 NOTE — TRANSFER OF CARE
"Anesthesia Transfer of Care Note    Patient: Tona Leone    Procedure(s) Performed: Procedure(s) (LRB):  FULL THICKNESS SKIN GRAFT TO THE LEFT FOREARM (Left)  HARVEST-GRAFT-SKIN (Left)    Patient location: PACU    Anesthesia Type: MAC    Transport from OR: Transported from OR on room air with adequate spontaneous ventilation    Post pain: adequate analgesia    Post assessment: no apparent anesthetic complications    Post vital signs: stable    Level of consciousness: awake    Nausea/Vomiting: no nausea/vomiting    Complications: none    Transfer of care protocol was followed      Last vitals:   Visit Vitals  BP (!) 127/58 (BP Location: Right arm, Patient Position: Lying)   Pulse 64   Temp 36.6 °C (97.9 °F) (Oral)   Resp 12   Ht 5' 8" (1.727 m)   Wt 87.1 kg (192 lb)   SpO2 95%   BMI 29.19 kg/m²     "

## 2018-01-12 ENCOUNTER — TELEPHONE (OUTPATIENT)
Dept: FAMILY MEDICINE | Facility: CLINIC | Age: 83
End: 2018-01-12

## 2018-01-12 NOTE — ANESTHESIA POSTPROCEDURE EVALUATION
"Anesthesia Post Evaluation    Patient: Tona Leone    Procedure(s) Performed: Procedure(s) (LRB):  FULL THICKNESS SKIN GRAFT TO THE LEFT FOREARM (Left)  HARVEST-GRAFT-SKIN (Left)    Final Anesthesia Type: general  Patient location during evaluation: PACU  Patient participation: Yes- Able to Participate  Level of consciousness: awake and alert, oriented and awake  Post-procedure vital signs: reviewed and stable  Airway patency: patent  PONV status at discharge: No PONV  Anesthetic complications: no      Cardiovascular status: blood pressure returned to baseline  Respiratory status: unassisted, spontaneous ventilation and room air  Hydration status: euvolemic  Follow-up not needed.        Visit Vitals  /65   Pulse (!) 56   Temp 37 °C (98.6 °F)   Resp 18   Ht 5' 8" (1.727 m)   Wt 87.1 kg (192 lb)   SpO2 98%   BMI 29.19 kg/m²       Pain/Mari Score: Pain Assessment Performed: Yes (1/11/2018 11:20 AM)  Presence of Pain: denies (1/11/2018 11:20 AM)  Pain Rating Prior to Med Admin: 0 (1/11/2018 11:08 AM)  Mari Score: 10 (1/11/2018 11:20 AM)      "

## 2018-01-12 NOTE — TELEPHONE ENCOUNTER
Spoke with Rui. She states that message was a notification to PCP re: authorization for patient for palliative care

## 2018-01-12 NOTE — TELEPHONE ENCOUNTER
----- Message from Gifty Zuniga sent at 1/8/2018  1:59 PM CST -----  Contact: ALICIA Ayala from [facility] calling regarding Palliative Care for patient. Please contact member services at [phone]. [id number]/authorization # [id number]    Thanks!

## 2018-01-12 NOTE — TELEPHONE ENCOUNTER
Unusual request so please call them and ask and clarify what they are referring to by palliative care.    Are they asking for an order for hospice?    If so, Dr. MALONE typically uses [facility] but we may need to ask if family has a preference and so forth

## 2018-02-07 ENCOUNTER — PATIENT MESSAGE (OUTPATIENT)
Dept: CASE MANAGEMENT | Facility: HOSPITAL | Age: 83
End: 2018-02-07

## 2018-02-08 ENCOUNTER — LAB VISIT (OUTPATIENT)
Dept: LAB | Facility: HOSPITAL | Age: 83
End: 2018-02-08
Attending: INTERNAL MEDICINE
Payer: MEDICARE

## 2018-02-08 ENCOUNTER — OFFICE VISIT (OUTPATIENT)
Dept: FAMILY MEDICINE | Facility: CLINIC | Age: 83
End: 2018-02-08
Payer: MEDICARE

## 2018-02-08 VITALS
HEIGHT: 68 IN | DIASTOLIC BLOOD PRESSURE: 60 MMHG | SYSTOLIC BLOOD PRESSURE: 118 MMHG | HEART RATE: 68 BPM | TEMPERATURE: 98 F | OXYGEN SATURATION: 96 %

## 2018-02-08 DIAGNOSIS — D64.9 ANEMIA, UNSPECIFIED TYPE: ICD-10-CM

## 2018-02-08 DIAGNOSIS — F39 MOOD DISORDER: ICD-10-CM

## 2018-02-08 DIAGNOSIS — I50.9 CONGESTIVE HEART FAILURE, UNSPECIFIED CONGESTIVE HEART FAILURE CHRONICITY, UNSPECIFIED CONGESTIVE HEART FAILURE TYPE: ICD-10-CM

## 2018-02-08 DIAGNOSIS — G89.29 CHRONIC PAIN OF LEFT KNEE: ICD-10-CM

## 2018-02-08 DIAGNOSIS — I10 ESSENTIAL HYPERTENSION: ICD-10-CM

## 2018-02-08 DIAGNOSIS — R52 PAIN: ICD-10-CM

## 2018-02-08 DIAGNOSIS — F11.20 OPIATE DEPENDENCE, CONTINUOUS: ICD-10-CM

## 2018-02-08 DIAGNOSIS — M25.562 CHRONIC PAIN OF LEFT KNEE: ICD-10-CM

## 2018-02-08 DIAGNOSIS — I48.0 PAROXYSMAL ATRIAL FIBRILLATION: ICD-10-CM

## 2018-02-08 DIAGNOSIS — G89.29 OTHER CHRONIC PAIN: ICD-10-CM

## 2018-02-08 DIAGNOSIS — G47.33 OSA TREATED WITH BIPAP: ICD-10-CM

## 2018-02-08 DIAGNOSIS — L98.9 LESION OF SUBCUTANEOUS TISSUE: ICD-10-CM

## 2018-02-08 DIAGNOSIS — I73.9 PAD (PERIPHERAL ARTERY DISEASE): ICD-10-CM

## 2018-02-08 DIAGNOSIS — Z00.00 ROUTINE MEDICAL EXAM: Primary | ICD-10-CM

## 2018-02-08 LAB
BASOPHILS # BLD AUTO: 0.05 K/UL
BASOPHILS NFR BLD: 0.6 %
DIFFERENTIAL METHOD: ABNORMAL
EOSINOPHIL # BLD AUTO: 0.6 K/UL
EOSINOPHIL NFR BLD: 7.3 %
ERYTHROCYTE [DISTWIDTH] IN BLOOD BY AUTOMATED COUNT: 16 %
FERRITIN SERPL-MCNC: 100 NG/ML
HCT VFR BLD AUTO: 36.1 %
HGB BLD-MCNC: 11 G/DL
IMM GRANULOCYTES # BLD AUTO: 0.01 K/UL
IMM GRANULOCYTES NFR BLD AUTO: 0.1 %
IRON SERPL-MCNC: 58 UG/DL
LYMPHOCYTES # BLD AUTO: 2.5 K/UL
LYMPHOCYTES NFR BLD: 32.4 %
MCH RBC QN AUTO: 29 PG
MCHC RBC AUTO-ENTMCNC: 30.5 G/DL
MCV RBC AUTO: 95 FL
MONOCYTES # BLD AUTO: 0.8 K/UL
MONOCYTES NFR BLD: 10.8 %
NEUTROPHILS # BLD AUTO: 3.8 K/UL
NEUTROPHILS NFR BLD: 48.8 %
NRBC BLD-RTO: 0 /100 WBC
PLATELET # BLD AUTO: 206 K/UL
PMV BLD AUTO: 10.1 FL
RBC # BLD AUTO: 3.79 M/UL
SATURATED IRON: 17 %
TOTAL IRON BINDING CAPACITY: 348 UG/DL
TRANSFERRIN SERPL-MCNC: 235 MG/DL
WBC # BLD AUTO: 7.78 K/UL

## 2018-02-08 PROCEDURE — G0009 ADMIN PNEUMOCOCCAL VACCINE: HCPCS | Mod: S$GLB,,, | Performed by: INTERNAL MEDICINE

## 2018-02-08 PROCEDURE — 99499 UNLISTED E&M SERVICE: CPT | Mod: S$GLB,,, | Performed by: INTERNAL MEDICINE

## 2018-02-08 PROCEDURE — 3008F BODY MASS INDEX DOCD: CPT | Mod: S$GLB,,, | Performed by: INTERNAL MEDICINE

## 2018-02-08 PROCEDURE — 99999 PR PBB SHADOW E&M-EST. PATIENT-LVL III: CPT | Mod: PBBFAC,,, | Performed by: INTERNAL MEDICINE

## 2018-02-08 PROCEDURE — 1159F MED LIST DOCD IN RCRD: CPT | Mod: S$GLB,,, | Performed by: INTERNAL MEDICINE

## 2018-02-08 PROCEDURE — 99214 OFFICE O/P EST MOD 30 MIN: CPT | Mod: 25,S$GLB,, | Performed by: INTERNAL MEDICINE

## 2018-02-08 PROCEDURE — 83540 ASSAY OF IRON: CPT

## 2018-02-08 PROCEDURE — 1126F AMNT PAIN NOTED NONE PRSNT: CPT | Mod: S$GLB,,, | Performed by: INTERNAL MEDICINE

## 2018-02-08 PROCEDURE — 90732 PPSV23 VACC 2 YRS+ SUBQ/IM: CPT | Mod: S$GLB,,, | Performed by: INTERNAL MEDICINE

## 2018-02-08 PROCEDURE — 36415 COLL VENOUS BLD VENIPUNCTURE: CPT | Mod: PO

## 2018-02-08 PROCEDURE — 85025 COMPLETE CBC W/AUTO DIFF WBC: CPT

## 2018-02-08 PROCEDURE — 99397 PER PM REEVAL EST PAT 65+ YR: CPT | Mod: 25,S$GLB,, | Performed by: INTERNAL MEDICINE

## 2018-02-08 PROCEDURE — 82728 ASSAY OF FERRITIN: CPT

## 2018-02-08 RX ORDER — HYDROCODONE BITARTRATE AND ACETAMINOPHEN 5; 325 MG/1; MG/1
1 TABLET ORAL EVERY 12 HOURS PRN
Qty: 60 TABLET | Refills: 0 | Status: SHIPPED | OUTPATIENT
Start: 2018-02-08 | End: 2018-05-18 | Stop reason: SDUPTHER

## 2018-02-08 RX ORDER — HYDROCODONE BITARTRATE AND ACETAMINOPHEN 5; 325 MG/1; MG/1
1 TABLET ORAL EVERY 12 HOURS PRN
Qty: 60 TABLET | Refills: 0 | Status: SHIPPED | OUTPATIENT
Start: 2018-03-08 | End: 2018-05-18 | Stop reason: SDUPTHER

## 2018-02-08 RX ORDER — HYDROCODONE BITARTRATE AND ACETAMINOPHEN 5; 325 MG/1; MG/1
1 TABLET ORAL EVERY 12 HOURS PRN
Qty: 60 TABLET | Refills: 0 | Status: SHIPPED | OUTPATIENT
Start: 2018-04-08 | End: 2018-05-18 | Stop reason: SDUPTHER

## 2018-02-08 NOTE — PROGRESS NOTES
Chief complaint Physical     89-year-old white female here with her daughter for her physical.  patient declines any further health maintenance such as colonoscopy and mammogram and that's appropriate for age.  She is due for all her blood work.  She is active as she can be.  She transports to and fro primarily in a wheelchair.  She did receive Prevnar but we cannot document a Pneumovax although probably did get it once after 65.  We will provide her with another          ROS:   CONST: weight stable. EYES: no vision change. ENT: no sore throat. CV: no chest pain w/ exertion. RESP: no shortness of breath. GI: no nausea, vomiting, diarrhea, some recent constipation. No dysphagia. : no urinary issues. MUSCULOSKELETAL: no new myalgias or arthralgias. SKIN: no new changes. NEURO: no focal deficits. PSYCH: no new issues. ENDOCRINE: no polyuria. HEME: no lymph nodes. ALLERGY: no general pruritis.      PREVENTATIVE: Her last pneumovax was in 2009. She had a colonscopy in 4/2009. Normal.    PAST MEDICAL HISTORY:  HTN  Hyperlipidemia  Depression  H/o TIA 2009  Atrial Fibrillation, Followed by the heart clinic  tear in right rotator cuff clinically, Dr. Juan  History of peptic ulcer disease  Slight vitamin D deficiency-29  Chronic pain, outside pain management  Pneumovax 2009 ?  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 Olivier     SOCIAL HISTORY:  She does not smoke. She has one drink a per week. She is .    FAMILY HISTORY:  Her brother had a stroke. There is also a family history of heart disease and hypertension    Vitals as above  Gen: no distress  EYES: conjunctiva clear, non-icteric, PERRL  ENT: nose clear, nasal mucosa normal, oropharynx clear and moist, teeth good,   NECK:supple, thyroid non-palpable  RESP: effort is good, lungs clear  CV: heart RRR w/o  murmur, gallops or rubs; no carotid bruits, trace edema left ankle but not the right  GI: abdomen soft, non-distended, non-tender, no hepatosplenomegaly  MS: in wheelchair, no clubbing or cyanosis of the digits,   SKIN: No rashes, warm to touch  Tona was seen today for annual exam.    Diagnoses and associated orders for this visit:    Routine medical exam---Will up date lab work, no further cancer screening necessary.  Update Pneumovax just in case                                      Additional evaluation and management issues:    Additionally patient has numerous other medical issues as well as certain complaints to address today.  Her hypertension appears to be fairly well-controlled .  Her lipids are previously controlled but needs reassessment.   her vitamin D deficiency previously okay.  She is on appropriate medication for her atrial fibrillation and her rate appears to be controlled.  She is on medication for depression as well as gout in both appear to be stable.  She is on allopurinol with no gout attacks.  Her chronic pain was managed by an outside pain management but now PCP.  Apparently she was seeing an outside pain management he getting her prescriptions and there was a call by one of the daughters stating that she is bedridden and if called to get her back to that pain management and wanted us to write her opiates.  We discussed that even here she will need to have appointments in order to get controlled substances and so they're here to discuss this issue.  It appears she takes about 2 pills per day.  We discussed all the issues regarding narcotics and we'll provide the 3 months of prescriptions at this point.   A lot of her pain relates to her left knee which is bone on bone as well as bilateral shoulder pain.  She has received cortisone injections at the [facility] and we discussed a referral to pain management. Knee block apparently no help but tried.     She did have some preop labs  prior to her cancer removal.  She did have a reduction in hemoglobin but no clinical bleeding.  She does have a history of ulcers.  She's been taking the Protonix for reflux intermittently and we discussed possibly taking it daily especially if anemia worsens and we assume that might be some GI loss of the blood.    There also inquires about some more portable oxygen since they have a rolling concentrator but it makes noise and she would like something more portable and quiet for Faith.  I did write a prescription for pulse oxygen at 2 L and perhaps she can get liquid oxygen.  I will send that to her insurance company and have the family never call the DME company as well to check on status          All these issues reviewed and patient counseled in evaluation and management of the separate issues will be based upon time counselingTotal time over 25 minutes with over 50% counseling.    Assessment and plan:        Lesion of subcutaneous tissue, squamous cell carcinoma removed and receiving wound care    Chronic pain of left knee, controlled substance management done    Congestive heart failure, unspecified congestive heart failure chronicity, unspecified congestive heart failure type, continues on oxygen and we'll try to get more portable oxygen    PAD (peripheral artery disease) chronic and stable    Paroxysmal atrial fibrillation, continues on anticoagulation    Essential hypertension, chronic and stable    CHRIS treated with BiPAP, apparently did receive the BiPAP that we had discussed and ordered at last appointment    Other chronic pain    Opiate dependence, continuous    Mood disorder    Pain  -     hydrocodone-acetaminophen 5-325mg (NORCO) 5-325 mg per tablet; Take 1 tablet by mouth every 12 (twelve) hours as needed for Pain.  -     hydrocodone-acetaminophen 5-325mg (NORCO) 5-325 mg per tablet; Take 1 tablet by mouth every 12 (twelve) hours as needed for Pain.  -     hydrocodone-acetaminophen 5-325mg (NORCO)  "5-325 mg per tablet; Take 1 tablet by mouth every 12 (twelve) hours as needed for Pain.    Anemia, unspecified type, appears to have had some anemia years ago but then normalized and now recurs, history of peptic ulcer disease, a PPI daily, recheck CBC and iron studies while on anticoagulant workup accordingly  -     CBC auto differential; Future  -     Iron and TIBC; Future  -     Ferritin; Future    Other orders  -     (In Office Administered) Pneumococcal Polysaccharide Vaccine (23 Valent) (SQ/IM)    Clinical note be sensitive since patient herself is admittedly not the one with active and do not want family members not present to take things out of context"This note will not be shared with the patient."  Also controlled substances management  "

## 2018-02-23 ENCOUNTER — TELEPHONE (OUTPATIENT)
Dept: FAMILY MEDICINE | Facility: CLINIC | Age: 83
End: 2018-02-23

## 2018-02-23 NOTE — TELEPHONE ENCOUNTER
----- Message from Aye Drummond sent at 2/22/2018  9:44 AM CST -----  Contact: [facility]  [facility] calling to state that they received orders for portable liquid oxygen. Please call [phone].

## 2018-02-23 NOTE — TELEPHONE ENCOUNTER
Probably best to ask the  here at the clinic to contact the patient to explain to them if indeed [facility] will not cover liquid oxygen, more portable oxygen etc. or to discuss with them their options

## 2018-02-23 NOTE — TELEPHONE ENCOUNTER
Spoke with [facility] and they can not provide liquis oxygen. Wanted to to know if dr Kendall wanted to change order or vendors

## 2018-05-03 ENCOUNTER — NURSE TRIAGE (OUTPATIENT)
Dept: ADMINISTRATIVE | Facility: CLINIC | Age: 83
End: 2018-05-03

## 2018-05-04 NOTE — TELEPHONE ENCOUNTER
Reason for Disposition   Taking Coumadin (warfarin) or other strong blood thinner, or known bleeding disorder (e.g., thrombocytopenia)   Scalp swelling, bruise or pain (all triage questions negative)    Answer Assessment - Initial Assessment Questions  Daughter reported she found pt on floor. She uses a walker, had gotten up to get a banana- went to sit on the walker which wasn't locked and fell onto a linoleum floor. This wasn't witnessed. Neighbor helped daughter get  Her  Up and she used walker to get back to her chair. Is walking/alert/verbal. C/o of some right arm pain which she has had in the past due to arthritis- possibly exacerbated it with fall but is moving all extremities and no LOC reported. Stated she did hit the top of head in the back but not hard and there is currently no lumps/bruises/tenderness.    Protocols used: ST HEAD INJURY-A-AH

## 2018-05-04 NOTE — TELEPHONE ENCOUNTER
Daughter calling back as requested. Reported pt is doing ok- has been walking around. Only c/o is her arm as before hurting a little more but no bruising/swelling and is using arm. Pt going to bed now=will wake her in 4 hrs as instructed.

## 2018-05-18 ENCOUNTER — OFFICE VISIT (OUTPATIENT)
Dept: FAMILY MEDICINE | Facility: CLINIC | Age: 83
End: 2018-05-18
Payer: MEDICARE

## 2018-05-18 VITALS
TEMPERATURE: 98 F | WEIGHT: 197 LBS | OXYGEN SATURATION: 97 % | SYSTOLIC BLOOD PRESSURE: 132 MMHG | HEART RATE: 80 BPM | DIASTOLIC BLOOD PRESSURE: 70 MMHG | BODY MASS INDEX: 29.86 KG/M2 | HEIGHT: 68 IN

## 2018-05-18 DIAGNOSIS — I73.9 PAD (PERIPHERAL ARTERY DISEASE): ICD-10-CM

## 2018-05-18 DIAGNOSIS — F11.20 OPIATE DEPENDENCE, CONTINUOUS: ICD-10-CM

## 2018-05-18 DIAGNOSIS — R52 PAIN: ICD-10-CM

## 2018-05-18 DIAGNOSIS — F39 MOOD DISORDER: ICD-10-CM

## 2018-05-18 DIAGNOSIS — I48.0 PAROXYSMAL ATRIAL FIBRILLATION: ICD-10-CM

## 2018-05-18 DIAGNOSIS — E55.9 VITAMIN D DEFICIENCY DISEASE: ICD-10-CM

## 2018-05-18 DIAGNOSIS — M25.562 CHRONIC PAIN OF LEFT KNEE: ICD-10-CM

## 2018-05-18 DIAGNOSIS — D64.9 ANEMIA, UNSPECIFIED TYPE: ICD-10-CM

## 2018-05-18 DIAGNOSIS — G89.29 OTHER CHRONIC PAIN: ICD-10-CM

## 2018-05-18 DIAGNOSIS — G89.29 CHRONIC PAIN OF LEFT KNEE: ICD-10-CM

## 2018-05-18 DIAGNOSIS — I10 ESSENTIAL HYPERTENSION: ICD-10-CM

## 2018-05-18 DIAGNOSIS — M25.511 ACUTE PAIN OF RIGHT SHOULDER: Primary | ICD-10-CM

## 2018-05-18 DIAGNOSIS — E78.5 HYPERLIPIDEMIA, UNSPECIFIED HYPERLIPIDEMIA TYPE: ICD-10-CM

## 2018-05-18 PROCEDURE — 99499 UNLISTED E&M SERVICE: CPT | Mod: S$GLB,,, | Performed by: INTERNAL MEDICINE

## 2018-05-18 PROCEDURE — 99999 PR PBB SHADOW E&M-EST. PATIENT-LVL III: CPT | Mod: PBBFAC,,, | Performed by: INTERNAL MEDICINE

## 2018-05-18 PROCEDURE — 99214 OFFICE O/P EST MOD 30 MIN: CPT | Mod: S$GLB,,, | Performed by: INTERNAL MEDICINE

## 2018-05-18 RX ORDER — HYDROCODONE BITARTRATE AND ACETAMINOPHEN 5; 325 MG/1; MG/1
1 TABLET ORAL EVERY 12 HOURS PRN
Qty: 60 TABLET | Refills: 0 | Status: SHIPPED | OUTPATIENT
Start: 2018-05-18 | End: 2018-08-23 | Stop reason: SDUPTHER

## 2018-05-18 RX ORDER — HYDROCODONE BITARTRATE AND ACETAMINOPHEN 5; 325 MG/1; MG/1
1 TABLET ORAL EVERY 12 HOURS PRN
Qty: 60 TABLET | Refills: 0 | Status: SHIPPED | OUTPATIENT
Start: 2018-06-18 | End: 2018-08-23 | Stop reason: SDUPTHER

## 2018-05-18 RX ORDER — HYDROCODONE BITARTRATE AND ACETAMINOPHEN 5; 325 MG/1; MG/1
1 TABLET ORAL EVERY 12 HOURS PRN
Qty: 60 TABLET | Refills: 0 | Status: SHIPPED | OUTPATIENT
Start: 2018-07-18 | End: 2018-08-23 | Stop reason: SDUPTHER

## 2018-05-18 NOTE — PROGRESS NOTES
Chief complaint follow-up on pain medications, right shoulder pain    89-year-old white female here with her daughter .  patient declines any further health maintenance such as colonoscopy and mammogram and that's appropriate for age.  She is due for all her blood work.  She is active as she can be.  She transports to and fro primarily in a wheelchair.      Patient recently had 2 falls.  One when she went to sit on her rolling walker and  sees landing on her buttocks.  Another she fell out of bed.  Both of which caused her recently to hurt her right shoulder.  She had reduced range of motion previously that now has some pain in the mid arm.  Moderate severity.  She did not go to the emergency room.  We discussed that if indeed she has a small tumors fracture that would be really no change in management.  We discussed possible x-ray which she does not think that's indicated at this time.  She would like to review lab work and it appears she's had some dental blood work in the last couple of months that are adequate.  She does need refills of her pain medication.  She is only really needing to per day even with this recent injury.  We discussed I be habit increase the frequency of her medications to 3 a day and so forth if indeed there is a clinical indication and they all just needs to let me know.Total time over 25 minutes with over 50% counseling.        Her hypertension appears to be fairly well-controlled .  Her lipids are previously controlled but needs reassessment.   her vitamin D deficiency previously okay.  She is on appropriate medication for her atrial fibrillation and her rate appears to be controlled.  She is on medication for depression as well as gout in both appear to be stable.  She is on allopurinol with no gout attacks.  Her chronic pain was managed by an outside pain management but now PCP.  Apparently she was seeing an outside pain management he getting her prescriptions and there was a call  by one of the daughters stating that she is bedridden and if called to get her back to that pain management and wanted us to write her opiates.  We discussed that even here she will need to have appointments in order to get controlled substances and so they're here to discuss this issue.  It appears she takes about 2 pills per day.  We discussed all the issues regarding narcotics and we'll provide the 3 months of prescriptions at this point.   A lot of her pain relates to her left knee which is bone on bone as well as bilateral shoulder pain.  She has [facility] and we discussed a referral to pain management. Knee block apparently no help but tried.     She did have some preop labs prior to her cancer removal.  She did have a reduction in hemoglobin but no clinical bleeding.  She does have a history of ulcers.  She's been taking the Protonix for reflux intermittently and we discussed possibly taking it daily especially if anemia worsens and we assume that might be some GI loss of the blood.          ROS:   CONST: weight stable. EYES: no vision change. ENT: no sore throat. CV: no chest pain w/ exertion. RESP: no shortness of breath. GI: no nausea, vomiting, diarrhea, some recent constipation. No dysphagia. : no urinary issues. MUSCULOSKELETAL: no new myalgias or arthralgias. SKIN: no new changes. NEURO: no focal deficits. PSYCH: no new issues. ENDOCRINE: no polyuria. HEME: no lymph nodes. ALLERGY: no general pruritis.      PREVENTATIVE: Her last pneumovax was in 2009. She had a colonscopy in 4/2009. Normal.    PAST MEDICAL HISTORY:  HTN  Hyperlipidemia  Depression  H/o TIA 2009  Atrial Fibrillation, Followed by the heart clinic  tear in right rotator cuff clinically, Dr. Juan  History of peptic ulcer disease  Slight vitamin D deficiency-29  Chronic pain, outside pain management  Pneumovax 2009 ?  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 Olivier     SOCIAL HISTORY:  She does not smoke. She has one drink a per week. She is .    FAMILY HISTORY:  Her brother had a stroke. There is also a family history of heart disease and hypertension    Vitals as above  Gen: no distress  Musculoskeletal: She has decreased abduction in both shoulders.  Slight internal neck, rotation on the left without much pain.  I can palpate the arm and the humerus without any defects or significant pain.  Subjectively her pain is over the mid humerus with the deltoid it would insert but also may just be referred shoulder pain to that area.  She denies any prior or current bruising of the arm after the injury.  Heart irregular with slight systolic murmur.  Respiratory efforts good lungs are clear anteriorly she is on oxygen.  She has trace pretibial edema in both lower extremities.  No cellulitis.    Tona was seen today for medication refill.    Diagnoses and all orders for this visit:    Acute pain of right shoulder, acute on chronic shoulder pain suspect she may have had some rotator cuff injury, contusion to the shoulder and if indeed there was a humerus fracture would be likely nonsurgical and so it really would not alter management to investigate further and patient was reluctant either way.  She does not need any increase in her chronic pain management    Chronic pain of left knee, source of chronic pain    Essential hypertension, chronic and stable    Other chronic pain, controlled substance management done    Opiate dependence, continuous    Mood disorder, stable    Anemia, unspecified type, CBC reviewed and slightly improved    Vitamin D deficiency disease, continues on supplement    Hyperlipidemia, unspecified hyperlipidemia type, continues on statin    Paroxysmal atrial fibrillation, still in nature fibrillation and on  "antiarrhythmics    PAD (peripheral artery disease)    Pain  -     HYDROcodone-acetaminophen (NORCO) 5-325 mg per tablet; Take 1 tablet by mouth every 12 (twelve) hours as needed for Pain.  -     HYDROcodone-acetaminophen (NORCO) 5-325 mg per tablet; Take 1 tablet by mouth every 12 (twelve) hours as needed for Pain.  -     HYDROcodone-acetaminophen (NORCO) 5-325 mg per tablet; Take 1 tablet by mouth every 12 (twelve) hours as needed for Pain.            Clinical note be sensitive since patient herself is admittedly not the one with active and do not want family members not present to take things out of context"//"This note will not be shared with the patient.""  Also controlled substances management  "

## 2018-07-12 ENCOUNTER — TELEPHONE (OUTPATIENT)
Dept: FAMILY MEDICINE | Facility: CLINIC | Age: 83
End: 2018-07-12

## 2018-07-12 NOTE — TELEPHONE ENCOUNTER
----- Message from Saul Scruggs sent at 7/12/2018 10:35 AM CDT -----  Contact: Daughter  [facility] called requesting an order for a portable oxygen. Please send information to N o) [phone].    Thanks-

## 2018-07-13 NOTE — TELEPHONE ENCOUNTER
----- Message from Jyoti Winn sent at 7/13/2018  3:16 PM CDT -----  Contact: [facility]/Daughter/[phone]  [facility] called to follow up on a request for patient's CPAP supplies.  Thank you.

## 2018-07-13 NOTE — TELEPHONE ENCOUNTER
Please get clarification, says she needs oxygen and another says CPAP supplies. I do not see in the clinic notes that she is on oxygen at home.    If oxygen is needed, I really don't have the information necessary to order it.  We would need to find out where she is currently getting oxygen and if they have any information about who ordered it, what was her initial low oxygen level etc.      I think this is a CPAP supply issue    Also, looks like they are active on my [facility] and please ask if they can communicate that way.  It would've been much more direct and clear

## 2018-07-16 NOTE — TELEPHONE ENCOUNTER
This order is correct. Daughter is requesting CPAP supplies and a portable O2 tank. Stated, she was placed on O2 by her cardiologist at  Dr Zaidi. Patient is currently on O2 at home and/or during office visits. Please send orders to [facility].

## 2018-07-16 NOTE — TELEPHONE ENCOUNTER
"Please call family and ask how she is currently using the oxygen.  This is the information I need for the prescription.  How many liters per minute and does she use it all the time or just intermittently around the home.    Portable oxygen can only be given as a "pulse"  oxygen and portable option cannot provide continuous oxygen, continuous flow.      If she requires continuous oxygen to keep her levels up, then she cannot use the portable      Prescribing portable oxygen is not as simple as they think and they may need to be explained that.      Prescription written for CPAP supplies to send her insurance company.  "

## 2018-07-17 NOTE — TELEPHONE ENCOUNTER
2L continuous flow. Requesting portable for traveling usage when away from home for less than 2-3 hours. CPAP supplies script faxed. Please advise.

## 2018-07-17 NOTE — TELEPHONE ENCOUNTER
Thank you, prescription written, please fax to the insurance company    Note that if insurance company calls for documentation of her oxygen needs, all that was done apparently at an outside cardiologist office and we do not have that documentation

## 2018-07-23 RX ORDER — APIXABAN 2.5 MG/1
2.5 TABLET, FILM COATED ORAL 2 TIMES DAILY
Qty: 60 TABLET | Refills: 8 | Status: SHIPPED | OUTPATIENT
Start: 2018-07-23 | End: 2019-04-16 | Stop reason: SDUPTHER

## 2018-08-23 ENCOUNTER — OFFICE VISIT (OUTPATIENT)
Dept: FAMILY MEDICINE | Facility: CLINIC | Age: 83
End: 2018-08-23
Payer: MEDICARE

## 2018-08-23 ENCOUNTER — LAB VISIT (OUTPATIENT)
Dept: LAB | Facility: HOSPITAL | Age: 83
End: 2018-08-23
Attending: INTERNAL MEDICINE
Payer: MEDICARE

## 2018-08-23 VITALS
DIASTOLIC BLOOD PRESSURE: 82 MMHG | HEART RATE: 64 BPM | SYSTOLIC BLOOD PRESSURE: 130 MMHG | BODY MASS INDEX: 28.49 KG/M2 | TEMPERATURE: 98 F | HEIGHT: 68 IN | WEIGHT: 188 LBS | OXYGEN SATURATION: 99 %

## 2018-08-23 DIAGNOSIS — J96.20 ACUTE AND CHR RESP FAILURE, UNSP W HYPOXIA OR HYPERCAPNIA: ICD-10-CM

## 2018-08-23 DIAGNOSIS — N39.0 URINARY TRACT INFECTION WITHOUT HEMATURIA, SITE UNSPECIFIED: ICD-10-CM

## 2018-08-23 DIAGNOSIS — F11.20 OPIATE DEPENDENCE, CONTINUOUS: ICD-10-CM

## 2018-08-23 DIAGNOSIS — I10 ESSENTIAL HYPERTENSION: ICD-10-CM

## 2018-08-23 DIAGNOSIS — G89.29 OTHER CHRONIC PAIN: Primary | ICD-10-CM

## 2018-08-23 DIAGNOSIS — R52 PAIN: ICD-10-CM

## 2018-08-23 LAB
BACTERIA #/AREA URNS AUTO: ABNORMAL /HPF
BILIRUB UR QL STRIP: NEGATIVE
CLARITY UR REFRACT.AUTO: ABNORMAL
COLOR UR AUTO: YELLOW
GLUCOSE UR QL STRIP: NEGATIVE
HGB UR QL STRIP: ABNORMAL
HYALINE CASTS UR QL AUTO: 0 /LPF
KETONES UR QL STRIP: NEGATIVE
LEUKOCYTE ESTERASE UR QL STRIP: ABNORMAL
MICROSCOPIC COMMENT: ABNORMAL
NITRITE UR QL STRIP: NEGATIVE
PH UR STRIP: 7 [PH] (ref 5–8)
PROT UR QL STRIP: ABNORMAL
RBC #/AREA URNS AUTO: 86 /HPF (ref 0–4)
SP GR UR STRIP: 1.01 (ref 1–1.03)
SQUAMOUS #/AREA URNS AUTO: 2 /HPF
URN SPEC COLLECT METH UR: ABNORMAL
UROBILINOGEN UR STRIP-ACNC: NEGATIVE EU/DL
WBC #/AREA URNS AUTO: >100 /HPF (ref 0–5)
WBC CLUMPS UR QL AUTO: ABNORMAL

## 2018-08-23 PROCEDURE — 99214 OFFICE O/P EST MOD 30 MIN: CPT | Mod: S$GLB,,, | Performed by: INTERNAL MEDICINE

## 2018-08-23 PROCEDURE — 81001 URINALYSIS AUTO W/SCOPE: CPT

## 2018-08-23 PROCEDURE — 87077 CULTURE AEROBIC IDENTIFY: CPT

## 2018-08-23 PROCEDURE — 99999 PR PBB SHADOW E&M-EST. PATIENT-LVL III: CPT | Mod: PBBFAC,,, | Performed by: INTERNAL MEDICINE

## 2018-08-23 PROCEDURE — 87086 URINE CULTURE/COLONY COUNT: CPT

## 2018-08-23 PROCEDURE — 99499 UNLISTED E&M SERVICE: CPT | Mod: S$GLB,,, | Performed by: INTERNAL MEDICINE

## 2018-08-23 PROCEDURE — 87186 SC STD MICRODIL/AGAR DIL: CPT

## 2018-08-23 PROCEDURE — 87088 URINE BACTERIA CULTURE: CPT

## 2018-08-23 RX ORDER — HYDROCODONE BITARTRATE AND ACETAMINOPHEN 5; 325 MG/1; MG/1
1 TABLET ORAL EVERY 12 HOURS PRN
Qty: 60 TABLET | Refills: 0 | Status: SHIPPED | OUTPATIENT
Start: 2018-08-23 | End: 2018-11-28 | Stop reason: SDUPTHER

## 2018-08-23 RX ORDER — AMLODIPINE BESYLATE 5 MG/1
TABLET ORAL
COMMUNITY
Start: 2018-08-12 | End: 2019-05-10 | Stop reason: SDUPTHER

## 2018-08-23 RX ORDER — HYDROCODONE BITARTRATE AND ACETAMINOPHEN 5; 325 MG/1; MG/1
1 TABLET ORAL EVERY 12 HOURS PRN
Qty: 60 TABLET | Refills: 0 | Status: SHIPPED | OUTPATIENT
Start: 2018-10-23 | End: 2018-11-28 | Stop reason: SDUPTHER

## 2018-08-23 RX ORDER — HYDROCODONE BITARTRATE AND ACETAMINOPHEN 5; 325 MG/1; MG/1
1 TABLET ORAL EVERY 12 HOURS PRN
Qty: 60 TABLET | Refills: 0 | Status: SHIPPED | OUTPATIENT
Start: 2018-09-23 | End: 2018-11-28 | Stop reason: SDUPTHER

## 2018-08-23 RX ORDER — METOPROLOL SUCCINATE 25 MG/1
TABLET, EXTENDED RELEASE ORAL
COMMUNITY
Start: 2018-08-18 | End: 2019-08-08 | Stop reason: SDUPTHER

## 2018-08-23 RX ORDER — NITROFURANTOIN 25; 75 MG/1; MG/1
100 CAPSULE ORAL 2 TIMES DAILY
Qty: 10 CAPSULE | Refills: 0 | Status: SHIPPED | OUTPATIENT
Start: 2018-08-23 | End: 2018-08-28

## 2018-08-23 NOTE — PROGRESS NOTES
Chief complaint prescriptions, possible UTI    89-year-old white female here with her daughter .  Patient does wear a diaper at night for 12 hr so that she does not have to get up but has no incontinence or does not wear 1 during the day.  She does have some new itching when she urinates but no other classic UTI symptoms.  No mental status changes.  Her urine dip today is cloudy with nitrite positive, leukocyte positive, 50 blood.  We will treat with Macrobid.  Daughter inquires if a preventative medication can be given but does not appear that she has frequent enough UTIs or severe symptoms associated with UTIs to warrant daily medications.  We discussed resistance.  Will send urinalysis and culture so that we can get an idea of her resistance pattern and so forth.    Also here for her chronic pain management.  To use the medication is chronic and stable allows her to have some quality of life.  I will send them over the computer.  We reviewed labs and will have her follow up in February of 2019 for her annual physical.  Previous blood work unremarkable and not necessitating repeat blood work at this time.Total time over 25 minutes with over 50% counseling.    Also counseled regarding oxygen supplementation for her chronic respiratory failure.  Pulse ox today was 99% on 2 L continuous.  We discussed the differences between continuous and pulse.  They do have a pulse oximeter at home.  We discussed coverage issues but I will attempt to send a prescription to her pharmacy as I do believe she may do well with the more portable oxygen device and she probably will do well on pulse therapy if covered.            ROS:   CONST: weight stable. EYES: no vision change. ENT: no sore throat. CV: no chest pain w/ exertion. RESP: no shortness of breath. GI: no nausea, vomiting, diarrhea, some recent constipation. No dysphagia. : no urinary issues. MUSCULOSKELETAL: no new myalgias or arthralgias. SKIN: no new changes. NEURO: no  focal deficits. PSYCH: no new issues. ENDOCRINE: no polyuria. HEME: no lymph nodes. ALLERGY: no general pruritis.      PREVENTATIVE: Her last pneumovax was in 2009. She had a colonscopy in 4/2009. Normal.    PAST MEDICAL HISTORY:  HTN  Hyperlipidemia  Depression  H/o TIA 2009  Atrial Fibrillation, Followed by the heart clinic  tear in right rotator cuff clinically, Dr. Juan  History of peptic ulcer disease  Slight vitamin D deficiency-29  Chronic pain, outside pain management  Pneumovax 2009 ?  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 Olivier     SOCIAL HISTORY:  She does not smoke. She has one drink a per week. She is .    FAMILY HISTORY:  Her brother had a stroke. There is also a family history of heart disease and hypertension    Vitals as above  Gen: no distress  S patient very pleasant, abdomen is soft, nondistended and nontender, no bladder pain she is in wheelchair.    Tona was seen today for medication refill.    Diagnoses and all orders for this visit:    Other chronic pain, controlled substance management and monitoring done    Opiate dependence, continuous    Essential hypertension, chronic and stable    Acute and chr resp failure, unsp w hypoxia or hypercapnia, will send prescription to attempt to get pulse oxygen with a more portable oxygen device and patient and family will need to monitor pulse oximeter if indeed they switched from continuous to pulse.  Insurance may not cover portable oxygen however we discussed all these issues at length    Urinary tract infection without hematuria, site unspecified, discussed as above  -     Urinalysis; Future  -     Urine culture; Future    Pain  -     HYDROcodone-acetaminophen (NORCO) 5-325 mg per tablet; Take 1 tablet by mouth every 12 (twelve) hours as needed for Pain.  -     HYDROcodone-acetaminophen  "(NORCO) 5-325 mg per tablet; Take 1 tablet by mouth every 12 (twelve) hours as needed for Pain.  -     HYDROcodone-acetaminophen (NORCO) 5-325 mg per tablet; Take 1 tablet by mouth every 12 (twelve) hours as needed for Pain.    Other orders  -     nitrofurantoin, macrocrystal-monohydrate, (MACROBID) 100 MG capsule; Take 1 capsule (100 mg total) by mouth 2 (two) times daily. for 5 days           Clinical note be sensitive since patient herself is admittedly not the one with active and do not want family members not present to take things out of context  Also controlled substances management"This note will not be shared with the patient."  "

## 2018-08-25 LAB — BACTERIA UR CULT: NORMAL

## 2018-08-30 RX ORDER — ATORVASTATIN CALCIUM 40 MG/1
40 TABLET, FILM COATED ORAL NIGHTLY
Qty: 90 TABLET | Refills: 3 | Status: SHIPPED | OUTPATIENT
Start: 2018-08-30 | End: 2019-05-10 | Stop reason: SDUPTHER

## 2018-09-21 RX ORDER — PAROXETINE HYDROCHLORIDE 40 MG/1
40 TABLET, FILM COATED ORAL DAILY
Qty: 90 TABLET | Refills: 3 | Status: SHIPPED | OUTPATIENT
Start: 2018-09-21 | End: 2018-11-28

## 2018-09-24 RX ORDER — PAROXETINE HYDROCHLORIDE 40 MG/1
40 TABLET, FILM COATED ORAL DAILY
Qty: 90 TABLET | Refills: 3 | Status: SHIPPED | OUTPATIENT
Start: 2018-09-24 | End: 2019-05-10 | Stop reason: SDUPTHER

## 2018-09-25 RX ORDER — LOSARTAN POTASSIUM 100 MG/1
100 TABLET ORAL DAILY
Qty: 90 TABLET | Refills: 3 | Status: SHIPPED | OUTPATIENT
Start: 2018-09-25 | End: 2019-05-10 | Stop reason: SDUPTHER

## 2018-10-20 RX ORDER — ALLOPURINOL 300 MG/1
300 TABLET ORAL DAILY
Qty: 90 TABLET | Refills: 3 | Status: SHIPPED | OUTPATIENT
Start: 2018-10-20 | End: 2019-05-10 | Stop reason: SDUPTHER

## 2018-10-31 ENCOUNTER — TELEPHONE (OUTPATIENT)
Dept: FAMILY MEDICINE | Facility: CLINIC | Age: 83
End: 2018-10-31

## 2018-10-31 DIAGNOSIS — G47.33 OSA (OBSTRUCTIVE SLEEP APNEA): Primary | ICD-10-CM

## 2018-10-31 NOTE — TELEPHONE ENCOUNTER
Requesting a written order for CPAP supplies: mask, tubing, headgear, and filters. Please contact when script is ready.

## 2018-10-31 NOTE — TELEPHONE ENCOUNTER
----- Message from Teetee Smith sent at 10/31/2018  2:04 PM CDT -----  Contact: [phone]  Patient's daughter is calling to get another order for CPAP supplies. She would like to pick it up from the office. Please call at your earliest convenience.

## 2018-11-28 ENCOUNTER — OFFICE VISIT (OUTPATIENT)
Dept: FAMILY MEDICINE | Facility: CLINIC | Age: 83
End: 2018-11-28
Payer: MEDICARE

## 2018-11-28 VITALS
HEART RATE: 75 BPM | WEIGHT: 188 LBS | OXYGEN SATURATION: 98 % | HEIGHT: 68 IN | BODY MASS INDEX: 28.49 KG/M2 | DIASTOLIC BLOOD PRESSURE: 90 MMHG | SYSTOLIC BLOOD PRESSURE: 128 MMHG

## 2018-11-28 DIAGNOSIS — L57.0 AK (ACTINIC KERATOSIS): ICD-10-CM

## 2018-11-28 DIAGNOSIS — G89.29 OTHER CHRONIC PAIN: Primary | ICD-10-CM

## 2018-11-28 DIAGNOSIS — R52 PAIN: ICD-10-CM

## 2018-11-28 DIAGNOSIS — L98.9 SKIN LESION: ICD-10-CM

## 2018-11-28 DIAGNOSIS — F11.20 OPIATE DEPENDENCE, CONTINUOUS: ICD-10-CM

## 2018-11-28 DIAGNOSIS — I10 ESSENTIAL HYPERTENSION: ICD-10-CM

## 2018-11-28 PROCEDURE — 1101F PT FALLS ASSESS-DOCD LE1/YR: CPT | Mod: CPTII,S$GLB,, | Performed by: INTERNAL MEDICINE

## 2018-11-28 PROCEDURE — 99999 PR PBB SHADOW E&M-EST. PATIENT-LVL IV: CPT | Mod: PBBFAC,,, | Performed by: INTERNAL MEDICINE

## 2018-11-28 PROCEDURE — 99214 OFFICE O/P EST MOD 30 MIN: CPT | Mod: S$GLB,,, | Performed by: INTERNAL MEDICINE

## 2018-11-28 RX ORDER — HYDROCODONE BITARTRATE AND ACETAMINOPHEN 5; 325 MG/1; MG/1
1 TABLET ORAL EVERY 12 HOURS PRN
Qty: 60 TABLET | Refills: 0 | Status: SHIPPED | OUTPATIENT
Start: 2019-01-28 | End: 2019-05-10 | Stop reason: ALTCHOICE

## 2018-11-28 RX ORDER — HYDROCODONE BITARTRATE AND ACETAMINOPHEN 5; 325 MG/1; MG/1
1 TABLET ORAL EVERY 12 HOURS PRN
Qty: 60 TABLET | Refills: 0 | Status: SHIPPED | OUTPATIENT
Start: 2018-12-28 | End: 2019-05-02 | Stop reason: SDUPTHER

## 2018-11-28 RX ORDER — HYDROCODONE BITARTRATE AND ACETAMINOPHEN 5; 325 MG/1; MG/1
1 TABLET ORAL EVERY 12 HOURS PRN
Qty: 60 TABLET | Refills: 0 | Status: SHIPPED | OUTPATIENT
Start: 2018-11-28 | End: 2019-01-29 | Stop reason: SDUPTHER

## 2018-11-28 NOTE — PROGRESS NOTES
Chief complaint prescriptions, skin lesions    89-year-old white female here with her daughter .       here for her chronic pain management.  Her use of pain medication is chronic and stable allows her to have some quality of life.  I will send them over the computer.  We reviewed labs and will have her follow up in February of 2019 for her annual physical.  Previous blood work unremarkable and not necessitating repeat blood work at this time.Total time over 25 minutes with over 50% counseling.    Also counseled regarding oxygen supplementation for her chronic respiratory failure.  Pulse ox today was 98% on 2 L continuous.  We discussed the differences between continuous and pulse.  They do have a pulse oximeter at home.  We discussed coverage issues but I will attempt to send a prescription to her pharmacy as I do believe she may do well with the more portable oxygen device and she probably will do well on pulse therapy if covered.    She does have some actinic keratoses on her forehead.  She also has a large lesion on the vertex of the scalp which is partly covered with cream today.  It does appear to be a seborrheic keratosis.  It is somewhat soft and nontender.  It is not hard indurated but we did discuss the possibility of cancer since she has had a significant cancer removed from her left arm.  A put in a referral back to her dermatologist and the daughter will make a call to make the appointment        ROS:   CONST: weight stable. EYES: no vision change. ENT: no sore throat. CV: no chest pain w/ exertion. RESP: no shortness of breath. GI: no nausea, vomiting, diarrhea, some recent constipation. No dysphagia. : no urinary issues. MUSCULOSKELETAL: no new myalgias or arthralgias. SKIN: no new changes. NEURO: no focal deficits. PSYCH: no new issues. ENDOCRINE: no polyuria. HEME: no lymph nodes. ALLERGY: no general pruritis.      PREVENTATIVE: Her last pneumovax was in 2009. She had a colonscopy in 4/2009.  "Normal.    PAST MEDICAL HISTORY:  HTN  Hyperlipidemia  Depression  H/o TIA 2009  Atrial Fibrillation, Followed by the heart clinic  tear in right rotator cuff clinically, Dr. Juan  History of peptic ulcer disease  Slight vitamin D deficiency-29  Chronic pain, outside pain management  Pneumovax 2009 ?  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 Olivier     SOCIAL HISTORY:  She does not smoke. She has one drink a per week. She is .    FAMILY HISTORY:  Her brother had a stroke. There is also a family history of heart disease and hypertension    Vitals as above  Gen: no distress  patient very pleasant, skin examined as above she is in wheelchair.    Tona was seen today for medication refill.    Diagnoses and all orders for this visit:    Other chronic pain, controlled substance management and monitoring done    Pain  -     HYDROcodone-acetaminophen (NORCO) 5-325 mg per tablet; Take 1 tablet by mouth every 12 (twelve) hours as needed for Pain.  -     HYDROcodone-acetaminophen (NORCO) 5-325 mg per tablet; Take 1 tablet by mouth every 12 (twelve) hours as needed for Pain.  -     HYDROcodone-acetaminophen (NORCO) 5-325 mg per tablet; Take 1 tablet by mouth every 12 (twelve) hours as needed for Pain.    Opiate dependence, continuous    Essential hypertension, chronic and stable    Skin lesion, needs assessment and malignancy ruled out  -     Ambulatory referral to Dermatology    AK (actinic keratosis)  -     Ambulatory referral to Dermatology        Clinical note be sensitive since patient herself is admittedly not the one with active and do not want family members not present to take things out of context  Also controlled substances management"This note will not be shared with the patient."  "

## 2019-01-15 ENCOUNTER — PATIENT OUTREACH (OUTPATIENT)
Dept: ADMINISTRATIVE | Facility: HOSPITAL | Age: 84
End: 2019-01-15

## 2019-01-29 ENCOUNTER — OFFICE VISIT (OUTPATIENT)
Dept: FAMILY MEDICINE | Facility: CLINIC | Age: 84
End: 2019-01-29
Payer: MEDICARE

## 2019-01-29 VITALS
WEIGHT: 188 LBS | TEMPERATURE: 98 F | DIASTOLIC BLOOD PRESSURE: 70 MMHG | OXYGEN SATURATION: 98 % | HEIGHT: 68 IN | HEART RATE: 89 BPM | SYSTOLIC BLOOD PRESSURE: 110 MMHG | BODY MASS INDEX: 28.49 KG/M2

## 2019-01-29 DIAGNOSIS — I50.9 CONGESTIVE HEART FAILURE, UNSPECIFIED HF CHRONICITY, UNSPECIFIED HEART FAILURE TYPE: ICD-10-CM

## 2019-01-29 DIAGNOSIS — E78.5 HYPERLIPIDEMIA, UNSPECIFIED HYPERLIPIDEMIA TYPE: ICD-10-CM

## 2019-01-29 DIAGNOSIS — G47.33 OSA TREATED WITH BIPAP: ICD-10-CM

## 2019-01-29 DIAGNOSIS — Z13.6 ENCOUNTER FOR SCREENING FOR CARDIOVASCULAR DISORDERS: ICD-10-CM

## 2019-01-29 DIAGNOSIS — F32.A DEPRESSION, UNSPECIFIED DEPRESSION TYPE: ICD-10-CM

## 2019-01-29 DIAGNOSIS — G89.29 CHRONIC PAIN OF LEFT KNEE: ICD-10-CM

## 2019-01-29 DIAGNOSIS — F39 MOOD DISORDER: ICD-10-CM

## 2019-01-29 DIAGNOSIS — I73.9 PAD (PERIPHERAL ARTERY DISEASE): ICD-10-CM

## 2019-01-29 DIAGNOSIS — M25.511 CHRONIC RIGHT SHOULDER PAIN: ICD-10-CM

## 2019-01-29 DIAGNOSIS — G89.29 CHRONIC RIGHT SHOULDER PAIN: ICD-10-CM

## 2019-01-29 DIAGNOSIS — I10 ESSENTIAL HYPERTENSION: ICD-10-CM

## 2019-01-29 DIAGNOSIS — J96.10 CHRONIC RESPIRATORY FAILURE, UNSPECIFIED WHETHER WITH HYPOXIA OR HYPERCAPNIA: ICD-10-CM

## 2019-01-29 DIAGNOSIS — F11.20 OPIATE DEPENDENCE, CONTINUOUS: ICD-10-CM

## 2019-01-29 DIAGNOSIS — L89.301 PRESSURE INJURY OF BUTTOCK, STAGE 1, UNSPECIFIED LATERALITY: ICD-10-CM

## 2019-01-29 DIAGNOSIS — D50.9 IRON DEFICIENCY ANEMIA, UNSPECIFIED IRON DEFICIENCY ANEMIA TYPE: Primary | ICD-10-CM

## 2019-01-29 DIAGNOSIS — M25.562 CHRONIC PAIN OF LEFT KNEE: ICD-10-CM

## 2019-01-29 DIAGNOSIS — R52 PAIN: ICD-10-CM

## 2019-01-29 DIAGNOSIS — I48.0 PAROXYSMAL ATRIAL FIBRILLATION: ICD-10-CM

## 2019-01-29 PROCEDURE — 99215 PR OFFICE/OUTPT VISIT, EST, LEVL V, 40-54 MIN: ICD-10-PCS | Mod: S$GLB,,, | Performed by: FAMILY MEDICINE

## 2019-01-29 PROCEDURE — 99215 OFFICE O/P EST HI 40 MIN: CPT | Mod: S$GLB,,, | Performed by: FAMILY MEDICINE

## 2019-01-29 PROCEDURE — 1101F PR PT FALLS ASSESS DOC 0-1 FALLS W/OUT INJ PAST YR: ICD-10-PCS | Mod: CPTII,S$GLB,, | Performed by: FAMILY MEDICINE

## 2019-01-29 PROCEDURE — 99499 RISK ADDL DX/OHS AUDIT: ICD-10-PCS | Mod: S$GLB,,, | Performed by: FAMILY MEDICINE

## 2019-01-29 PROCEDURE — 99999 PR PBB SHADOW E&M-EST. PATIENT-LVL III: ICD-10-PCS | Mod: PBBFAC,,, | Performed by: FAMILY MEDICINE

## 2019-01-29 PROCEDURE — 99999 PR PBB SHADOW E&M-EST. PATIENT-LVL III: CPT | Mod: PBBFAC,,, | Performed by: FAMILY MEDICINE

## 2019-01-29 PROCEDURE — 99499 UNLISTED E&M SERVICE: CPT | Mod: S$GLB,,, | Performed by: FAMILY MEDICINE

## 2019-01-29 PROCEDURE — 1101F PT FALLS ASSESS-DOCD LE1/YR: CPT | Mod: CPTII,S$GLB,, | Performed by: FAMILY MEDICINE

## 2019-01-29 RX ORDER — HYDROCODONE BITARTRATE AND ACETAMINOPHEN 5; 325 MG/1; MG/1
1 TABLET ORAL EVERY 12 HOURS PRN
Qty: 60 TABLET | Refills: 0 | Status: SHIPPED | OUTPATIENT
Start: 2019-02-28 | End: 2019-03-30

## 2019-01-29 RX ORDER — HYDROCODONE BITARTRATE AND ACETAMINOPHEN 5; 325 MG/1; MG/1
1 TABLET ORAL EVERY 12 HOURS PRN
Qty: 60 TABLET | Refills: 0 | Status: SHIPPED | OUTPATIENT
Start: 2019-03-30 | End: 2019-04-29

## 2019-01-29 RX ORDER — HYDROCODONE BITARTRATE AND ACETAMINOPHEN 5; 325 MG/1; MG/1
1 TABLET ORAL EVERY 12 HOURS PRN
Qty: 60 TABLET | Refills: 0 | Status: SHIPPED | OUTPATIENT
Start: 2019-01-29 | End: 2019-02-28

## 2019-01-30 PROBLEM — F11.20 OPIATE DEPENDENCE, CONTINUOUS: Status: ACTIVE | Noted: 2019-01-30

## 2019-01-30 PROBLEM — J96.10 CHRONIC RESPIRATORY FAILURE: Status: ACTIVE | Noted: 2019-01-30

## 2019-01-30 NOTE — PROGRESS NOTES
Routine Office Visit    Patient Name: Tona Leone    : 1927  MRN: [id number]    Subjective:  Tona is a 89 y.o. female who presents today for     1. Establish care / new to me  2. Chronic pain management - pt is on chronic pain medication to help her have improvement in quality of life. She has chronic pain from arthritis in her bilateral shoulders and knees. She has hx of right knee replacement. She takes norco twice a day for severe pain. She does not abuse medication and does not take more than prescribed. She is able to communicate her need for medication.   3. Skin lesion on top of her head - pt had recent dermatological procedure in which she had biopsy of skin lesion. She is to follow-up with dermatology next week. She does have hx of skin cancer.   4. Heart failure / PAD / defibrillator in place - pt sees pulmonology and cardiology at [facility]. This was established after she was hospitalized at [facility] ER. Cardiologist - Dr. Escalante. Recommend to request MR / Visits sent to me after each visit.   5. Chronic respiratory failure - pt has pulmonology at [facility]. She is on 2L NC continuous. She feels the machine is larger and cumbersome to carry around.   6. Buttock skin in question - pt is not ambulatory and is cared for her her son Isaiah and Nory who take turns caring for patient. They noticed some skin redness in her buttock area and have been placing barrier cream to the area. There is improvement in redness. Pt requires 24 hour care. She is unable to do some of her ADLs such as cooking, cleaning, bathing, paying bills and getting dressed. She requires the help of her children. She is home-bound. There is no other help at home.     Review of Systems   Constitutional: Negative for chills and fever.   HENT: Negative for congestion.    Eyes: Negative for blurred vision.   Respiratory: Negative for cough.    Cardiovascular: Negative for chest pain.   Gastrointestinal: Negative for abdominal pain,  constipation, diarrhea, heartburn, nausea and vomiting.   Genitourinary: Negative for dysuria.   Musculoskeletal: Negative for myalgias.   Skin: Negative for itching and rash.   Neurological: Negative for dizziness and headaches.   Psychiatric/Behavioral: Negative for depression.       Active Problem List  Patient Active Problem List   Diagnosis    Atrial fibrillation    HTN (hypertension)    Shoulder pain    Hyperlipemia    Shoulder pain, right    Gout    Dermatitis    Postmenopausal    Mood disorder    Vitamin D deficiency disease    PAD (peripheral artery disease)    Arthritis of left knee    Chronic pain of left knee    Lesion of subcutaneous tissue    Congestive heart failure (CHF)    CHRIS treated with BiPAP    Opiate dependence, continuous    Chronic respiratory failure       Past Surgical History  Past Surgical History:   Procedure Laterality Date    CATARACT EXTRACTION      ou    FULL THICKNESS SKIN GRAFT TO THE LEFT FOREARM Left 1/11/2018    [facility]    HARVEST-GRAFT-SKIN Left 1/11/2018    [facility]    JOINT REPLACEMENT      Rt  knee  replacement    skin grafts         Family History  Family History   Problem Relation Age of Onset    Hypertension Mother     Cataracts Mother     Hypertension Father     Cataracts Father     Hypertension Brother     Cataracts Brother     Amblyopia Neg Hx     Blindness Neg Hx     Cancer Neg Hx     Diabetes Neg Hx     Macular degeneration Neg Hx     Retinal detachment Neg Hx     Strabismus Neg Hx     Stroke Neg Hx     Thyroid disease Neg Hx        Social History  Social History     Socioeconomic History    Marital status:      Spouse name: Not on file    Number of children: Not on file    Years of education: Not on file    Highest education level: Not on file   Social Needs    Financial resource strain: Not on file    Food insecurity - worry: Not on file     Food insecurity - inability: Not on file    Transportation needs - medical: Not on file    Transportation needs - non-medical: Not on file   Occupational History    Not on file   Tobacco Use    Smoking status: Former Smoker    Smokeless tobacco: Never Used   Substance and Sexual Activity    Alcohol use: No    Drug use: No    Sexual activity: Not Currently     Partners: Male   Other Topics Concern    Not on file   Social History Narrative    Not on file       Medications and Allergies  Reviewed and updated.   Current Outpatient Medications   Medication Sig    allopurinol (ZYLOPRIM) 300 MG tablet TAKE 1 TABLET (300 MG TOTAL) BY MOUTH ONCE DAILY.    amLODIPine (NORVASC) 5 MG tablet     aspirin 81 MG Chew Take 81 mg by mouth once daily.    atorvastatin (LIPITOR) 40 MG tablet TAKE 1 TABLET (40 MG TOTAL) BY MOUTH EVERY EVENING.    cholecalciferol, vitamin D3, (VITAMIN D3) 5,000 unit Tab Take 5,000 Units by mouth once daily.    cyanocobalamin (VITAMIN B-12) 1000 MCG tablet Take 100 mcg by mouth once daily.    ELIQUIS 2.5 mg Tab TAKE 1 TABLET (2.5 MG TOTAL) BY MOUTH 2 (TWO) TIMES DAILY.    furosemide (LASIX) 20 MG tablet Take 20 mg by mouth 2 (two) times daily.    HYDROcodone-acetaminophen (NORCO) 5-325 mg per tablet Take 1 tablet by mouth every 12 (twelve) hours as needed for Pain.    HYDROcodone-acetaminophen (NORCO) 5-325 mg per tablet Take 1 tablet by mouth every 12 (twelve) hours as needed for Pain.    HYDROcodone-acetaminophen (NORCO) 5-325 mg per tablet Take 1 tablet by mouth every 12 (twelve) hours as needed for Pain.    [START ON 2/28/2019] HYDROcodone-acetaminophen (NORCO) 5-325 mg per tablet Take 1 tablet by mouth every 12 (twelve) hours as needed for Pain.    [START ON 3/30/2019] HYDROcodone-acetaminophen (NORCO) 5-325 mg per tablet Take 1 tablet by mouth every 12 (twelve) hours as needed for Pain.    losartan (COZAAR) 100 MG tablet Take 1 tablet (100 mg total) by mouth once daily.     "metoprolol succinate (TOPROL-XL) 25 MG 24 hr tablet     multivitamin (ONE DAILY MULTIVITAMIN) per tablet Take 1 tablet by mouth once daily.    pantoprazole (PROTONIX) 40 MG tablet Take 1 tablet (40 mg total) by mouth once daily. (Patient taking differently: Take 40 mg by mouth daily as needed. )    paroxetine (PAXIL) 40 MG tablet TAKE 1 TABLET (40 MG TOTAL) BY MOUTH ONCE DAILY.    polyethylene glycol (GLYCOLAX) 17 gram/dose powder     albuterol 90 mcg/actuation inhaler Inhale 2 puffs into the lungs every 6 (six) hours as needed for Wheezing. Rescue    oxybutynin (DITROPAN-XL) 5 MG TR24 Take 1 tablet (5 mg total) by mouth once daily.     No current facility-administered medications for this visit.        Physical Exam  /70 (BP Location: Left arm, Patient Position: Sitting, BP Method: Large (Manual))   Pulse 89   Temp 97.9 °F (36.6 °C) (Oral)   Ht 5' 8" (1.727 m)   Wt 85.3 kg (188 lb)   SpO2 98%   BMI 28.59 kg/m²   Physical Exam   Constitutional: She is oriented to person, place, and time. She appears well-developed and well-nourished.   HENT:   Head: Normocephalic and atraumatic.   Eyes: Conjunctivae and EOM are normal. Pupils are equal, round, and reactive to light.   Neck: Normal range of motion. Neck supple.   Cardiovascular: Normal rate, regular rhythm and normal heart sounds. Exam reveals no gallop and no friction rub.   No murmur heard.  Pulmonary/Chest: Breath sounds normal. No respiratory distress.   Abdominal: Soft. Bowel sounds are normal. She exhibits no distension. There is no tenderness.   Musculoskeletal: Normal range of motion.   Lymphadenopathy:     She has no cervical adenopathy.   Neurological: She is alert and oriented to person, place, and time.   Skin: Skin is warm.   Psychiatric: She has a normal mood and affect.         Assessment/Plan:  Tona Leone is a 89 y.o. female who presents today for :    Problem List Items Addressed This Visit        Psychiatric    Mood " disorder  The current medical regimen is effective;  continue present plan and medications.      Opiate dependence, continuous  Noted in chart  Pt has been stable on this current medication regimen for some time now  It allows her to have stable and decrease pain in her life.        Pulmonary    Chronic respiratory failure  The current medical regimen is effective;  continue present plan and medications.  Continue f/u with pulmonology          Cardiac/Vascular    Atrial fibrillation    Congestive heart failure (CHF)  The current medical regimen is effective;  continue present plan and medications.  Recommend f/u with cardiology - Dr. Escalante       HTN (hypertension)  The current medical regimen is effective;  continue present plan and medications.      Hyperlipemia  The current medical regimen is effective;  continue present plan and medications.      PAD (peripheral artery disease)  The current medical regimen is effective;  continue present plan and medications.        Other    CHRIS treated with BiPAP  The current medical regimen is effective;  continue present plan and medications.        Other Visit Diagnoses     Iron deficiency anemia, unspecified iron deficiency anemia type    -  Primary    Relevant Orders    CBC auto differential    Comprehensive metabolic panel    Lipid panel    Iron and TIBC    Encounter for screening for cardiovascular disorders         Relevant Orders    Lipid panel    Pain     / Shoulder pain, right  / Chronic pain of left knee    Relevant Medications    HYDROcodone-acetaminophen (NORCO) 5-325 mg per tablet    HYDROcodone-acetaminophen (NORCO) 5-325 mg per tablet (Start on 2/28/2019)    HYDROcodone-acetaminophen (NORCO) 5-325 mg per tablet (Start on 3/30/2019)  Reviewed LA       Other Relevant Orders    Ambulatory referral to Social Work    Pressure injury of buttock, stage 1, unspecified laterality      Improving  Recommend frequent change in positions  Recommend  off-loading  Continue barrier creams prn             Greater than 45 minutes was spent with this patient with greater than 50% spent with face-to-face counseling    Follow-up in about 3 months (around 4/29/2019).

## 2019-02-15 ENCOUNTER — TELEPHONE (OUTPATIENT)
Dept: FAMILY MEDICINE | Facility: CLINIC | Age: 84
End: 2019-02-15

## 2019-02-15 DIAGNOSIS — G47.33 OSA TREATED WITH BIPAP: Primary | ICD-10-CM

## 2019-02-15 NOTE — TELEPHONE ENCOUNTER
Daughter called stating patient needs orders for CPAP supplies including the mask headgear and tubing. Print order and daughter will  the order;

## 2019-02-15 NOTE — TELEPHONE ENCOUNTER
----- Message from Becca Pacheco sent at 2/15/2019 10:18 AM CST -----  Contact: Jo Ann Cueto [phone]  Jo Ann Cueto is requesting to speak to you regarding Cpap machine and supplies. Pls call jo ann Cueto [phone]. Thanks........Mendy

## 2019-02-15 NOTE — TELEPHONE ENCOUNTER
What type of mask?  Facial or nasal mask?  Please pull up order and ask questions regarding cpap supplies.

## 2019-03-08 ENCOUNTER — LAB VISIT (OUTPATIENT)
Dept: LAB | Facility: HOSPITAL | Age: 84
End: 2019-03-08
Attending: FAMILY MEDICINE
Payer: MEDICARE

## 2019-03-08 DIAGNOSIS — Z13.6 ENCOUNTER FOR SCREENING FOR CARDIOVASCULAR DISORDERS: ICD-10-CM

## 2019-03-08 DIAGNOSIS — D50.9 IRON DEFICIENCY ANEMIA, UNSPECIFIED IRON DEFICIENCY ANEMIA TYPE: ICD-10-CM

## 2019-03-08 LAB
ALBUMIN SERPL BCP-MCNC: 3.3 G/DL
ALP SERPL-CCNC: 82 U/L
ALT SERPL W/O P-5'-P-CCNC: 14 U/L
ANION GAP SERPL CALC-SCNC: 8 MMOL/L
AST SERPL-CCNC: 16 U/L
BASOPHILS # BLD AUTO: 0.1 K/UL
BASOPHILS NFR BLD: 1 %
BILIRUB SERPL-MCNC: 0.6 MG/DL
BUN SERPL-MCNC: 17 MG/DL
CALCIUM SERPL-MCNC: 10.3 MG/DL
CHLORIDE SERPL-SCNC: 103 MMOL/L
CHOLEST SERPL-MCNC: 114 MG/DL
CHOLEST/HDLC SERPL: 3.1 {RATIO}
CO2 SERPL-SCNC: 32 MMOL/L
CREAT SERPL-MCNC: 0.9 MG/DL
DIFFERENTIAL METHOD: ABNORMAL
EOSINOPHIL # BLD AUTO: 0.8 K/UL
EOSINOPHIL NFR BLD: 7.5 %
ERYTHROCYTE [DISTWIDTH] IN BLOOD BY AUTOMATED COUNT: 13.9 %
EST. GFR  (AFRICAN AMERICAN): >60 ML/MIN/1.73 M^2
EST. GFR  (NON AFRICAN AMERICAN): 56.1 ML/MIN/1.73 M^2
GLUCOSE SERPL-MCNC: 98 MG/DL
HCT VFR BLD AUTO: 34.1 %
HDLC SERPL-MCNC: 37 MG/DL
HDLC SERPL: 32.5 %
HGB BLD-MCNC: 10.6 G/DL
IMM GRANULOCYTES # BLD AUTO: 0.04 K/UL
IMM GRANULOCYTES NFR BLD AUTO: 0.4 %
IRON SERPL-MCNC: 31 UG/DL
LDLC SERPL CALC-MCNC: 57.4 MG/DL
LYMPHOCYTES # BLD AUTO: 2.1 K/UL
LYMPHOCYTES NFR BLD: 21.3 %
MCH RBC QN AUTO: 30.2 PG
MCHC RBC AUTO-ENTMCNC: 31.1 G/DL
MCV RBC AUTO: 97 FL
MONOCYTES # BLD AUTO: 1.2 K/UL
MONOCYTES NFR BLD: 11.9 %
NEUTROPHILS # BLD AUTO: 5.8 K/UL
NEUTROPHILS NFR BLD: 57.9 %
NONHDLC SERPL-MCNC: 77 MG/DL
NRBC BLD-RTO: 0 /100 WBC
PLATELET # BLD AUTO: 221 K/UL
PMV BLD AUTO: 9.9 FL
POTASSIUM SERPL-SCNC: 4.2 MMOL/L
PROT SERPL-MCNC: 7.7 G/DL
RBC # BLD AUTO: 3.51 M/UL
SATURATED IRON: 12 %
SODIUM SERPL-SCNC: 143 MMOL/L
TOTAL IRON BINDING CAPACITY: 250 UG/DL
TRANSFERRIN SERPL-MCNC: 169 MG/DL
TRIGL SERPL-MCNC: 98 MG/DL
WBC # BLD AUTO: 9.94 K/UL

## 2019-03-08 PROCEDURE — 83540 ASSAY OF IRON: CPT

## 2019-03-08 PROCEDURE — 85025 COMPLETE CBC W/AUTO DIFF WBC: CPT

## 2019-03-08 PROCEDURE — 80061 LIPID PANEL: CPT

## 2019-03-08 PROCEDURE — 80053 COMPREHEN METABOLIC PANEL: CPT

## 2019-03-08 PROCEDURE — 36415 COLL VENOUS BLD VENIPUNCTURE: CPT | Mod: PO

## 2019-04-16 DIAGNOSIS — R52 PAIN: ICD-10-CM

## 2019-04-16 RX ORDER — APIXABAN 2.5 MG/1
2.5 TABLET, FILM COATED ORAL 2 TIMES DAILY
Qty: 60 TABLET | Refills: 8 | Status: SHIPPED | OUTPATIENT
Start: 2019-04-16

## 2019-04-16 RX ORDER — HYDROCODONE BITARTRATE AND ACETAMINOPHEN 5; 325 MG/1; MG/1
1 TABLET ORAL EVERY 12 HOURS PRN
Qty: 60 TABLET | Refills: 0 | OUTPATIENT
Start: 2019-04-16 | End: 2019-05-16

## 2019-04-17 RX ORDER — HYDROCODONE BITARTRATE AND ACETAMINOPHEN 5; 325 MG/1; MG/1
1 TABLET ORAL EVERY 12 HOURS PRN
Qty: 60 TABLET | Refills: 0 | OUTPATIENT
Start: 2019-04-17 | End: 2019-05-17

## 2019-04-26 ENCOUNTER — PATIENT OUTREACH (OUTPATIENT)
Dept: ADMINISTRATIVE | Facility: HOSPITAL | Age: 84
End: 2019-04-26

## 2019-05-02 DIAGNOSIS — R52 PAIN: ICD-10-CM

## 2019-05-02 RX ORDER — HYDROCODONE BITARTRATE AND ACETAMINOPHEN 5; 325 MG/1; MG/1
1 TABLET ORAL EVERY 12 HOURS PRN
Qty: 60 TABLET | Refills: 0 | Status: SHIPPED | OUTPATIENT
Start: 2019-05-02 | End: 2019-05-10 | Stop reason: ALTCHOICE

## 2019-05-10 ENCOUNTER — OFFICE VISIT (OUTPATIENT)
Dept: FAMILY MEDICINE | Facility: CLINIC | Age: 84
End: 2019-05-10
Payer: MEDICARE

## 2019-05-10 VITALS
OXYGEN SATURATION: 97 % | DIASTOLIC BLOOD PRESSURE: 70 MMHG | SYSTOLIC BLOOD PRESSURE: 130 MMHG | HEART RATE: 81 BPM | TEMPERATURE: 98 F | WEIGHT: 188.06 LBS | HEIGHT: 68 IN | BODY MASS INDEX: 28.5 KG/M2

## 2019-05-10 DIAGNOSIS — I48.0 PAROXYSMAL ATRIAL FIBRILLATION: ICD-10-CM

## 2019-05-10 DIAGNOSIS — Z23 NEED FOR PROPHYLACTIC VACCINATION USING TETANUS AND DIPHTHERIA TOXOIDS ADSORBED (TD) VACCINE: Primary | ICD-10-CM

## 2019-05-10 DIAGNOSIS — G47.33 OSA TREATED WITH BIPAP: ICD-10-CM

## 2019-05-10 DIAGNOSIS — C44.40 SKIN CANCER OF SCALP: ICD-10-CM

## 2019-05-10 DIAGNOSIS — I10 ESSENTIAL HYPERTENSION: ICD-10-CM

## 2019-05-10 DIAGNOSIS — E55.9 VITAMIN D DEFICIENCY DISEASE: ICD-10-CM

## 2019-05-10 DIAGNOSIS — I73.9 PAD (PERIPHERAL ARTERY DISEASE): ICD-10-CM

## 2019-05-10 DIAGNOSIS — E78.5 HYPERLIPIDEMIA, UNSPECIFIED HYPERLIPIDEMIA TYPE: ICD-10-CM

## 2019-05-10 DIAGNOSIS — Z78.0 POSTMENOPAUSAL: ICD-10-CM

## 2019-05-10 DIAGNOSIS — I50.9 CONGESTIVE HEART FAILURE, UNSPECIFIED HF CHRONICITY, UNSPECIFIED HEART FAILURE TYPE: ICD-10-CM

## 2019-05-10 DIAGNOSIS — R52 PAIN: ICD-10-CM

## 2019-05-10 DIAGNOSIS — F11.20 OPIATE DEPENDENCE, CONTINUOUS: ICD-10-CM

## 2019-05-10 PROCEDURE — 99215 PR OFFICE/OUTPT VISIT, EST, LEVL V, 40-54 MIN: ICD-10-PCS | Mod: 25,S$GLB,, | Performed by: FAMILY MEDICINE

## 2019-05-10 PROCEDURE — 99499 RISK ADDL DX/OHS AUDIT: ICD-10-PCS | Mod: S$GLB,,, | Performed by: FAMILY MEDICINE

## 2019-05-10 PROCEDURE — 90714 TD VACC NO PRESV 7 YRS+ IM: CPT | Mod: S$GLB,,, | Performed by: FAMILY MEDICINE

## 2019-05-10 PROCEDURE — 90714 TD VACCINE GREATER THAN OR EQUAL TO 7YO PRESERVATIVE FREE IM: ICD-10-PCS | Mod: S$GLB,,, | Performed by: FAMILY MEDICINE

## 2019-05-10 PROCEDURE — 90471 TD VACCINE GREATER THAN OR EQUAL TO 7YO PRESERVATIVE FREE IM: ICD-10-PCS | Mod: S$GLB,,, | Performed by: FAMILY MEDICINE

## 2019-05-10 PROCEDURE — 1101F PT FALLS ASSESS-DOCD LE1/YR: CPT | Mod: CPTII,S$GLB,, | Performed by: FAMILY MEDICINE

## 2019-05-10 PROCEDURE — 90471 IMMUNIZATION ADMIN: CPT | Mod: S$GLB,,, | Performed by: FAMILY MEDICINE

## 2019-05-10 PROCEDURE — 99999 PR PBB SHADOW E&M-EST. PATIENT-LVL III: ICD-10-PCS | Mod: PBBFAC,,, | Performed by: FAMILY MEDICINE

## 2019-05-10 PROCEDURE — 99499 UNLISTED E&M SERVICE: CPT | Mod: S$GLB,,, | Performed by: FAMILY MEDICINE

## 2019-05-10 PROCEDURE — 99999 PR PBB SHADOW E&M-EST. PATIENT-LVL III: CPT | Mod: PBBFAC,,, | Performed by: FAMILY MEDICINE

## 2019-05-10 PROCEDURE — 1101F PR PT FALLS ASSESS DOC 0-1 FALLS W/OUT INJ PAST YR: ICD-10-PCS | Mod: CPTII,S$GLB,, | Performed by: FAMILY MEDICINE

## 2019-05-10 PROCEDURE — 99215 OFFICE O/P EST HI 40 MIN: CPT | Mod: 25,S$GLB,, | Performed by: FAMILY MEDICINE

## 2019-05-10 RX ORDER — ATORVASTATIN CALCIUM 20 MG/1
20 TABLET, FILM COATED ORAL NIGHTLY
Qty: 90 TABLET | Refills: 3 | Status: SHIPPED | OUTPATIENT
Start: 2019-05-10 | End: 2019-11-07 | Stop reason: SDUPTHER

## 2019-05-10 RX ORDER — AMLODIPINE BESYLATE 5 MG/1
5 TABLET ORAL DAILY
Qty: 90 TABLET | Refills: 3 | Status: SHIPPED | OUTPATIENT
Start: 2019-05-10 | End: 2019-08-08 | Stop reason: SDUPTHER

## 2019-05-10 RX ORDER — HYDROCODONE BITARTRATE AND ACETAMINOPHEN 5; 325 MG/1; MG/1
1 TABLET ORAL EVERY 12 HOURS PRN
Qty: 60 TABLET | Refills: 0 | Status: ON HOLD | OUTPATIENT
Start: 2019-07-03 | End: 2019-07-13 | Stop reason: HOSPADM

## 2019-05-10 RX ORDER — HYDROCODONE BITARTRATE AND ACETAMINOPHEN 5; 325 MG/1; MG/1
1 TABLET ORAL EVERY 12 HOURS PRN
Qty: 60 TABLET | Refills: 0 | Status: ON HOLD | OUTPATIENT
Start: 2019-08-02 | End: 2019-07-13 | Stop reason: HOSPADM

## 2019-05-10 RX ORDER — PAROXETINE HYDROCHLORIDE 40 MG/1
40 TABLET, FILM COATED ORAL DAILY
Qty: 90 TABLET | Refills: 3 | Status: SHIPPED | OUTPATIENT
Start: 2019-05-10 | End: 2019-11-07 | Stop reason: SDUPTHER

## 2019-05-10 RX ORDER — FUROSEMIDE 20 MG/1
20 TABLET ORAL 2 TIMES DAILY
Qty: 180 TABLET | Refills: 3 | Status: SHIPPED | OUTPATIENT
Start: 2019-05-10 | End: 2019-11-07 | Stop reason: SDUPTHER

## 2019-05-10 RX ORDER — ALLOPURINOL 300 MG/1
300 TABLET ORAL DAILY
Qty: 90 TABLET | Refills: 3 | Status: SHIPPED | OUTPATIENT
Start: 2019-05-10 | End: 2019-11-07 | Stop reason: SDUPTHER

## 2019-05-10 RX ORDER — HYDROCODONE BITARTRATE AND ACETAMINOPHEN 5; 325 MG/1; MG/1
1 TABLET ORAL EVERY 12 HOURS PRN
Qty: 60 TABLET | Refills: 0 | Status: ON HOLD | OUTPATIENT
Start: 2019-06-04 | End: 2019-07-13 | Stop reason: HOSPADM

## 2019-05-10 RX ORDER — LOSARTAN POTASSIUM 100 MG/1
100 TABLET ORAL DAILY
Qty: 90 TABLET | Refills: 3 | Status: SHIPPED | OUTPATIENT
Start: 2019-05-10 | End: 2019-11-07 | Stop reason: SDUPTHER

## 2019-05-10 RX ORDER — ATORVASTATIN CALCIUM 40 MG/1
40 TABLET, FILM COATED ORAL NIGHTLY
Qty: 90 TABLET | Refills: 3 | Status: SHIPPED | OUTPATIENT
Start: 2019-05-10 | End: 2019-05-10 | Stop reason: SDUPTHER

## 2019-05-10 NOTE — LETTER
May 10, 2019      [facility]  [address] [facility]  [place] [address]  Phone: [phone]  Fax: [phone]       Patient: Tona Leone   YOB: 1927  Date of Visit: 05/10/2019    To Whom It May Concern:    Kimi Leone  was at [facility] on 05/10/2019 accompanied by her daughter, Nory.  If you have any questions or concerns, or if I can be of further assistance, please do not hesitate to contact me.    Sincerely,    Milla Lay MD

## 2019-05-11 NOTE — PROGRESS NOTES
Routine Office Visit    Patient Name: Tona Leone    : 1927  MRN: [id number]    Subjective:  Tona is a 89 y.o. female who presents today for     1. Chronic pain management - pt is on chronic pain medication to help her have improvement in quality of life. She has chronic pain from arthritis in her bilateral shoulders and knees. She has hx of right knee replacement. She takes norco twice a day for severe pain. She does not abuse medication and does not take more than prescribed. She is able to communicate her need for medication.   2. Skin lesion on top of her head - pt had recent dermatological procedure in which she had biopsy of skin lesion. She is to follow-up with dermatology next week. She does have hx of skin cancer. She is waiting for a procedure for removal for skin lesion.       Review of Systems   Constitutional: Negative for chills and fever.   HENT: Negative for congestion.    Eyes: Negative for blurred vision.   Respiratory: Negative for cough.    Cardiovascular: Negative for chest pain.   Gastrointestinal: Negative for abdominal pain, constipation, diarrhea, heartburn, nausea and vomiting.   Genitourinary: Negative for dysuria.   Musculoskeletal: Negative for myalgias.   Skin: Negative for itching and rash.   Neurological: Negative for dizziness and headaches.   Psychiatric/Behavioral: Negative for depression.       Active Problem List  Patient Active Problem List   Diagnosis    Atrial fibrillation    HTN (hypertension)    Shoulder pain    Hyperlipemia    Shoulder pain, right    Gout    Dermatitis    Postmenopausal    Mood disorder    Vitamin D deficiency disease    PAD (peripheral artery disease)    Arthritis of left knee    Chronic pain of left knee    Lesion of subcutaneous tissue    Congestive heart failure (CHF)    CHRIS treated with BiPAP    Opiate dependence, continuous    Chronic respiratory failure    Skin cancer of scalp       Past Surgical History  Past Surgical  History:   Procedure Laterality Date    CATARACT EXTRACTION      ou    FULL THICKNESS SKIN GRAFT TO THE LEFT FOREARM Left 1/11/2018    [facility]    HARVEST-GRAFT-SKIN Left 1/11/2018    [facility]    JOINT REPLACEMENT      Rt  knee  replacement    skin grafts         Family History  Family History   Problem Relation Age of Onset    Hypertension Mother     Cataracts Mother     Hypertension Father     Cataracts Father     Hypertension Brother     Cataracts Brother     Amblyopia Neg Hx     Blindness Neg Hx     Cancer Neg Hx     Diabetes Neg Hx     Macular degeneration Neg Hx     Retinal detachment Neg Hx     Strabismus Neg Hx     Stroke Neg Hx     Thyroid disease Neg Hx        Social History  Social History     Socioeconomic History    Marital status:      Spouse name: Not on file    Number of children: Not on file    Years of education: Not on file    Highest education level: Not on file   Occupational History    Not on file   Social Needs    Financial resource strain: Not on file    Food insecurity:     Worry: Not on file     Inability: Not on file    Transportation needs:     Medical: Not on file     Non-medical: Not on file   Tobacco Use    Smoking status: Former Smoker    Smokeless tobacco: Never Used   Substance and Sexual Activity    Alcohol use: No    Drug use: No    Sexual activity: Not Currently     Partners: Male   Lifestyle    Physical activity:     Days per week: Not on file     Minutes per session: Not on file    Stress: Not on file   Relationships    Social connections:     Talks on phone: Not on file     Gets together: Not on file     Attends [employer] service: Not on file     Active member of club or organization: Not on file     Attends meetings of clubs or organizations: Not on file     Relationship status: Not on file   Other Topics Concern    Not on file   Social History Narrative    Not  on file       Medications and Allergies  Reviewed and updated.   Current Outpatient Medications   Medication Sig    albuterol 90 mcg/actuation inhaler Inhale 2 puffs into the lungs every 6 (six) hours as needed for Wheezing. Rescue    allopurinol (ZYLOPRIM) 300 MG tablet Take 1 tablet (300 mg total) by mouth once daily.    amLODIPine (NORVASC) 5 MG tablet Take 1 tablet (5 mg total) by mouth once daily.    aspirin 81 MG Chew Take 81 mg by mouth once daily.    atorvastatin (LIPITOR) 20 MG tablet Take 1 tablet (20 mg total) by mouth every evening.    cholecalciferol, vitamin D3, (VITAMIN D3) 5,000 unit Tab Take 5,000 Units by mouth once daily.    cyanocobalamin (VITAMIN B-12) 1000 MCG tablet Take 100 mcg by mouth once daily.    ELIQUIS 2.5 mg Tab TAKE 1 TABLET (2.5 MG TOTAL) BY MOUTH 2 (TWO) TIMES DAILY.    furosemide (LASIX) 20 MG tablet Take 1 tablet (20 mg total) by mouth 2 (two) times daily.    [START ON 6/4/2019] HYDROcodone-acetaminophen (NORCO) 5-325 mg per tablet Take 1 tablet by mouth every 12 (twelve) hours as needed for Pain.    [START ON 7/3/2019] HYDROcodone-acetaminophen (NORCO) 5-325 mg per tablet Take 1 tablet by mouth every 12 (twelve) hours as needed for Pain.    [START ON 8/2/2019] HYDROcodone-acetaminophen (NORCO) 5-325 mg per tablet Take 1 tablet by mouth every 12 (twelve) hours as needed for Pain.    losartan (COZAAR) 100 MG tablet Take 1 tablet (100 mg total) by mouth once daily.    metoprolol succinate (TOPROL-XL) 25 MG 24 hr tablet     multivitamin (ONE DAILY MULTIVITAMIN) per tablet Take 1 tablet by mouth once daily.    pantoprazole (PROTONIX) 40 MG tablet Take 1 tablet (40 mg total) by mouth once daily. (Patient taking differently: Take 40 mg by mouth daily as needed. )    paroxetine (PAXIL) 40 MG tablet Take 1 tablet (40 mg total) by mouth once daily.    polyethylene glycol (GLYCOLAX) 17 gram/dose powder     oxybutynin (DITROPAN-XL) 5 MG TR24 Take 1 tablet (5 mg total) by  "mouth once daily.     No current facility-administered medications for this visit.        Physical Exam  /70 (BP Location: Left arm, Patient Position: Sitting, BP Method: Large (Manual))   Pulse 81   Temp 97.9 °F (36.6 °C) (Oral)   Ht 5' 8" (1.727 m)   Wt 85.3 kg (188 lb 0.8 oz)   SpO2 97% Comment: on 2 liters  BMI 28.59 kg/m²   Physical Exam   Constitutional: She is oriented to person, place, and time. She appears well-developed and well-nourished.   HENT:   Head: Normocephalic and atraumatic.   Eyes: Pupils are equal, round, and reactive to light. Conjunctivae and EOM are normal.   Neck: Normal range of motion. Neck supple.   Cardiovascular: Normal rate, regular rhythm and normal heart sounds. Exam reveals no gallop and no friction rub.   No murmur heard.  Pulmonary/Chest: Breath sounds normal. No respiratory distress.   Abdominal: Soft. Bowel sounds are normal. She exhibits no distension. There is no tenderness.   Musculoskeletal: Normal range of motion.   Lymphadenopathy:     She has no cervical adenopathy.   Neurological: She is alert and oriented to person, place, and time.   Skin: Skin is warm.        Psychiatric: She has a normal mood and affect.         Assessment/Plan:  Tona Leone is a 89 y.o. female who presents today for :    Problem List Items Addressed This Visit        Psychiatric    Opiate dependence, continuous  Reviewed LA   Common side effects of this medication were discussed with the patient. Questions regarding medications were discussed during this visit.          Cardiac/Vascular    Atrial fibrillation  The current medical regimen is effective;  continue present plan and medications.      Congestive heart failure (CHF)  The current medical regimen is effective;  continue present plan and medications.      HTN (hypertension)    Relevant Medications    losartan (COZAAR) 100 MG tablet    furosemide (LASIX) 20 MG tablet    amLODIPine (NORVASC) 5 MG tablet  The current medical " regimen is effective;  continue present plan and medications.      Hyperlipemia    Relevant Medications    atorvastatin (LIPITOR) 20 MG tablet  The current medical regimen is effective;  continue present plan and medications.      PAD (peripheral artery disease)  The current medical regimen is effective;  continue present plan and medications.         Renal/    Postmenopausal    Relevant Medications    paroxetine (PAXIL) 40 MG tablet  The current medical regimen is effective;  continue present plan and medications.         Oncology    Skin cancer of scalp  Continue f/u with dermatology / surgery          Endocrine    Vitamin D deficiency disease  Noted in chart         Other    CHRIS treated with BiPAP  Noted in chart          Other Visit Diagnoses     Need for prophylactic vaccination using tetanus and diphtheria toxoids adsorbed (Td) vaccine    -  Primary    Relevant Orders    (In Office Administered) Td Vaccine - Preservative Free (Completed)    Pain        Relevant Medications    HYDROcodone-acetaminophen (NORCO) 5-325 mg per tablet (Start on 6/4/2019)    HYDROcodone-acetaminophen (NORCO) 5-325 mg per tablet (Start on 7/3/2019)    HYDROcodone-acetaminophen (NORCO) 5-325 mg per tablet (Start on 8/2/2019)  Common side effects of this medication were discussed with the patient. Questions regarding medications were discussed during this visit.   Reviewed LA               Follow up in about 3 months (around 8/10/2019).

## 2019-05-16 ENCOUNTER — HOSPITAL ENCOUNTER (OUTPATIENT)
Dept: PREADMISSION TESTING | Facility: HOSPITAL | Age: 84
Discharge: HOME | End: 2019-05-16
Attending: PLASTIC SURGERY
Payer: MEDICARE

## 2019-05-16 DIAGNOSIS — Z01.818 PREOP TESTING: Primary | ICD-10-CM

## 2019-05-16 LAB
ANION GAP SERPL CALC-SCNC: 7 MMOL/L (ref 8–16)
BASOPHILS # BLD AUTO: 0.05 K/UL (ref 0–0.2)
BASOPHILS NFR BLD: 0.5 % (ref 0–1.9)
BUN SERPL-MCNC: 20 MG/DL (ref 10–30)
CALCIUM SERPL-MCNC: 10.1 MG/DL (ref 8.7–10.5)
CHLORIDE SERPL-SCNC: 104 MMOL/L (ref 95–110)
CO2 SERPL-SCNC: 30 MMOL/L (ref 23–29)
CREAT SERPL-MCNC: 1.3 MG/DL (ref 0.5–1.4)
DIFFERENTIAL METHOD: ABNORMAL
EOSINOPHIL # BLD AUTO: 0.9 K/UL (ref 0–0.5)
EOSINOPHIL NFR BLD: 8.6 % (ref 0–8)
ERYTHROCYTE [DISTWIDTH] IN BLOOD BY AUTOMATED COUNT: 15.8 % (ref 11.5–14.5)
EST. GFR  (AFRICAN AMERICAN): 41 ML/MIN/1.73 M^2
EST. GFR  (NON AFRICAN AMERICAN): 36 ML/MIN/1.73 M^2
GLUCOSE SERPL-MCNC: 87 MG/DL (ref 70–110)
HCT VFR BLD AUTO: 35.7 % (ref 37–48.5)
HGB BLD-MCNC: 11.1 G/DL (ref 12–16)
LYMPHOCYTES # BLD AUTO: 3.1 K/UL (ref 1–4.8)
LYMPHOCYTES NFR BLD: 30.7 % (ref 18–48)
MCH RBC QN AUTO: 29.8 PG (ref 27–31)
MCHC RBC AUTO-ENTMCNC: 31.1 G/DL (ref 32–36)
MCV RBC AUTO: 96 FL (ref 82–98)
MONOCYTES # BLD AUTO: 1.1 K/UL (ref 0.3–1)
MONOCYTES NFR BLD: 11.3 % (ref 4–15)
NEUTROPHILS # BLD AUTO: 4.8 K/UL (ref 1.8–7.7)
NEUTROPHILS NFR BLD: 48.7 % (ref 38–73)
PLATELET # BLD AUTO: 213 K/UL (ref 150–350)
PMV BLD AUTO: 9 FL (ref 9.2–12.9)
POTASSIUM SERPL-SCNC: 4.3 MMOL/L (ref 3.5–5.1)
RBC # BLD AUTO: 3.73 M/UL (ref 4–5.4)
SODIUM SERPL-SCNC: 141 MMOL/L (ref 136–145)
WBC # BLD AUTO: 9.93 K/UL (ref 3.9–12.7)

## 2019-05-16 PROCEDURE — 80048 BASIC METABOLIC PNL TOTAL CA: CPT

## 2019-05-16 PROCEDURE — 93005 ELECTROCARDIOGRAM TRACING: CPT

## 2019-05-16 PROCEDURE — 36415 COLL VENOUS BLD VENIPUNCTURE: CPT

## 2019-05-16 PROCEDURE — 85025 COMPLETE CBC W/AUTO DIFF WBC: CPT

## 2019-05-16 PROCEDURE — 93010 ELECTROCARDIOGRAM REPORT: CPT | Mod: ,,, | Performed by: INTERNAL MEDICINE

## 2019-05-16 PROCEDURE — 93010 EKG 12-LEAD: ICD-10-PCS | Mod: ,,, | Performed by: INTERNAL MEDICINE

## 2019-05-16 NOTE — DISCHARGE INSTRUCTIONS
"Your procedure  is scheduled for __5/23/2019________.    Call [phone] between 2pm and 5pm on _5/22/2019______to find out your arrival time for the day of surgery.    Report to Same Day Surgery Unit at ____ AM on the 2nd floor of the hospital.  Use the front entrance of the hospital.  The front doors of the hospital open promptly at 5:30am.  If you need wheelchair assistance, call [phone] from your cell phone, or call "0" from the courtesy phone in the lobby.    Important instructions:   Do not eat or drink after 12 midnight, including water.  It is okay to brush your teeth.  Do not have gum, candy or mints.     Take only these medications with a small swallow of water on the morning of your surgery ___amlodipine, metoprolol, paroxetine___________      Stop taking Aspirin, Ibuprofen, Motrin and Aleve , Fish oil, and Vitamin E for at least 7 days before your surgery. You may use Tylenol unless otherwise instructed by your doctor.      Hold Eliquis as instructed by Dr Hernández.       Please shower the night before and the morning of your surgery.       Do not wear make- up, including mascara.     You may wear deodorant only.      Do not wear powder, body lotion or perfume/cologne.     Do not wear any jewelry or have any metal on your body.     You will be asked to remove any dentures or partials for the procedure.     Please bring any documents given to you by your doctor.     If you are going home on the same day of surgery, you must arrange for a family member or a friend to drive you home.  Public transportation is prohibited.  You will not be able to drive home if you were given anesthesia or sedation.     Wear loose fitting clothes allowing for bandages.     Please leave money and valuables home.       You may bring your cell phone.     Call the doctor if fever or illness should occur before your surgery.    Call [phone] to contact us here if needed.  "

## 2019-07-10 ENCOUNTER — HOSPITAL ENCOUNTER (OUTPATIENT)
Dept: PREADMISSION TESTING | Facility: HOSPITAL | Age: 84
Discharge: HOME | End: 2019-07-10
Attending: PLASTIC SURGERY
Payer: MEDICARE

## 2019-07-10 VITALS
TEMPERATURE: 97 F | BODY MASS INDEX: 29.03 KG/M2 | DIASTOLIC BLOOD PRESSURE: 72 MMHG | WEIGHT: 185 LBS | RESPIRATION RATE: 17 BRPM | OXYGEN SATURATION: 96 % | HEART RATE: 76 BPM | SYSTOLIC BLOOD PRESSURE: 116 MMHG | HEIGHT: 67 IN

## 2019-07-10 DIAGNOSIS — Z01.818 PRE-OP TESTING: Primary | ICD-10-CM

## 2019-07-10 LAB
ANION GAP SERPL CALC-SCNC: 8 MMOL/L (ref 8–16)
ANISOCYTOSIS BLD QL SMEAR: SLIGHT
BASOPHILS # BLD AUTO: 0.05 K/UL (ref 0–0.2)
BASOPHILS NFR BLD: 0.5 % (ref 0–1.9)
BUN SERPL-MCNC: 20 MG/DL (ref 10–30)
CALCIUM SERPL-MCNC: 10.4 MG/DL (ref 8.7–10.5)
CHLORIDE SERPL-SCNC: 106 MMOL/L (ref 95–110)
CO2 SERPL-SCNC: 28 MMOL/L (ref 23–29)
CREAT SERPL-MCNC: 1.4 MG/DL (ref 0.5–1.4)
DIFFERENTIAL METHOD: ABNORMAL
EOSINOPHIL # BLD AUTO: 0.6 K/UL (ref 0–0.5)
EOSINOPHIL NFR BLD: 6 % (ref 0–8)
ERYTHROCYTE [DISTWIDTH] IN BLOOD BY AUTOMATED COUNT: 16.9 % (ref 11.5–14.5)
EST. GFR  (AFRICAN AMERICAN): 38 ML/MIN/1.73 M^2
EST. GFR  (NON AFRICAN AMERICAN): 33 ML/MIN/1.73 M^2
GLUCOSE SERPL-MCNC: 94 MG/DL (ref 70–110)
HCT VFR BLD AUTO: 42 % (ref 37–48.5)
HGB BLD-MCNC: 11.6 G/DL (ref 12–16)
LYMPHOCYTES # BLD AUTO: 2.9 K/UL (ref 1–4.8)
LYMPHOCYTES NFR BLD: 31.3 % (ref 18–48)
MCH RBC QN AUTO: 30.6 PG (ref 27–31)
MCHC RBC AUTO-ENTMCNC: 27.6 G/DL (ref 32–36)
MCV RBC AUTO: 111 FL (ref 82–98)
MONOCYTES # BLD AUTO: 1 K/UL (ref 0.3–1)
MONOCYTES NFR BLD: 10.6 % (ref 4–15)
NEUTROPHILS # BLD AUTO: 4.7 K/UL (ref 1.8–7.7)
NEUTROPHILS NFR BLD: 51.8 % (ref 38–73)
PLATELET # BLD AUTO: 173 K/UL (ref 150–350)
PMV BLD AUTO: 11.2 FL (ref 9.2–12.9)
POTASSIUM SERPL-SCNC: 4.6 MMOL/L (ref 3.5–5.1)
RBC # BLD AUTO: 3.79 M/UL (ref 4–5.4)
SODIUM SERPL-SCNC: 142 MMOL/L (ref 136–145)
WBC # BLD AUTO: 9.17 K/UL (ref 3.9–12.7)

## 2019-07-10 PROCEDURE — 36415 COLL VENOUS BLD VENIPUNCTURE: CPT

## 2019-07-10 PROCEDURE — 80048 BASIC METABOLIC PNL TOTAL CA: CPT

## 2019-07-10 PROCEDURE — 85025 COMPLETE CBC W/AUTO DIFF WBC: CPT

## 2019-07-10 NOTE — DISCHARGE INSTRUCTIONS
"  Your surgery is scheduled for Friday July 12, 2019____________________.    Call [phone] between 2 p.m. and 5 p.m. on   _Thursday__ to find out your arrival time for the day of your surgery.      Please report to SAME DAY SURGERY UNIT on the 2nd FLOOR at _______ a.m.  Use front door entrance. The doors open at 0530 am.      If you need WHEELCHAIR assistance please call  [phone] from your cell phone or "0"  from the  hospital courtesy phone located to the right after you enter the hospital lobby.      INSTRUCTIONS IMPORTANT!!!  ¨ Do not eat or drink after 12 midnight-including water. OK to brush teeth, no   gum, candy or mints!    ¨ Take only these medicines with a small swallow of water-morning of surgery.  Take Amlodipine with swallow of water    TAKE LOVENOX AS DIRECTED      __x__  Prep instructions:    SHOWER     __x__  Please shower using Hibiclens soap the night before AND  the morning of your surgery/procedure. Do not use Hibiclens on your face or genitals              __x__  Do not wear makeup, including mascara. WEARING EYE MAKEUP MAY LEAD TO SERIOUS EYE INJURY during surgery.  __x__  No powder, lotions or creams to your body.  __x__  You may wear only deodorant on the day of surgery.  __x__  Please remove all jewelry, including piercings and leave at home.  __x__  No money or valuables needed. Please leave at home.  You may bring your cell phone.  __x__  Please bring any documents given by your doctor.  __x__  If going home the same day, arrange for a ride home. You will not be able to drive if Anesthesia was used.  __x__  Wear loose fitting clothing. Allow for dressings, bandages.  __x__  Stop Aspirin, Ibuprofen, Motrin and Aleve at least 3-5 days before  surgery, unless otherwise instructed by your doctor, or the nurse.        x      You MAY use Tylenol/acetaminophen until day of surgery.    __x__  Call MD for temperature above 101 degrees.        __x__ Stop taking any Fish Oil supplement or any " Vitamins that contain Vitamin E at least 5 days prior to surgery.          I have read or had read and explained to me, and understand the above information.  Additional comments or instructions:Please call   [phone] if you have any questions regarding the instructions above.

## 2019-07-11 ENCOUNTER — ANESTHESIA EVENT (OUTPATIENT)
Dept: SURGERY | Facility: HOSPITAL | Age: 84
End: 2019-07-11
Payer: MEDICARE

## 2019-07-12 ENCOUNTER — ANESTHESIA (OUTPATIENT)
Dept: SURGERY | Facility: HOSPITAL | Age: 84
End: 2019-07-12
Payer: MEDICARE

## 2019-07-12 ENCOUNTER — HOSPITAL ENCOUNTER (OUTPATIENT)
Facility: HOSPITAL | Age: 84
Discharge: HOME | End: 2019-07-13
Attending: PLASTIC SURGERY | Admitting: PLASTIC SURGERY
Payer: MEDICARE

## 2019-07-12 DIAGNOSIS — C44.42 SQUAMOUS CELL CARCINOMA OF SCALP: Primary | ICD-10-CM

## 2019-07-12 PROCEDURE — D9220A PRA ANESTHESIA: ICD-10-PCS | Mod: ANES,,, | Performed by: ANESTHESIOLOGY

## 2019-07-12 PROCEDURE — 25000003 PHARM REV CODE 250: Performed by: STUDENT IN AN ORGANIZED HEALTH CARE EDUCATION/TRAINING PROGRAM

## 2019-07-12 PROCEDURE — 71000033 HC RECOVERY, INTIAL HOUR: Performed by: PLASTIC SURGERY

## 2019-07-12 PROCEDURE — D9220A PRA ANESTHESIA: Mod: ANES,,, | Performed by: ANESTHESIOLOGY

## 2019-07-12 PROCEDURE — 88305 TISSUE EXAM BY PATHOLOGIST: CPT | Mod: 26,,, | Performed by: PATHOLOGY

## 2019-07-12 PROCEDURE — 71000039 HC RECOVERY, EACH ADD'L HOUR: Performed by: PLASTIC SURGERY

## 2019-07-12 PROCEDURE — 36000707: Performed by: PLASTIC SURGERY

## 2019-07-12 PROCEDURE — S0020 INJECTION, BUPIVICAINE HYDRO: HCPCS | Performed by: PLASTIC SURGERY

## 2019-07-12 PROCEDURE — 25000003 PHARM REV CODE 250: Performed by: PLASTIC SURGERY

## 2019-07-12 PROCEDURE — 88305 TISSUE EXAM BY PATHOLOGIST: CPT | Performed by: PATHOLOGY

## 2019-07-12 PROCEDURE — 25000003 PHARM REV CODE 250: Performed by: ANESTHESIOLOGY

## 2019-07-12 PROCEDURE — 37000009 HC ANESTHESIA EA ADD 15 MINS: Performed by: PLASTIC SURGERY

## 2019-07-12 PROCEDURE — 63600175 PHARM REV CODE 636 W HCPCS: Performed by: NURSE ANESTHETIST, CERTIFIED REGISTERED

## 2019-07-12 PROCEDURE — 88305 TISSUE SPECIMEN TO PATHOLOGY - SURGERY: ICD-10-PCS | Mod: 26,,, | Performed by: PATHOLOGY

## 2019-07-12 PROCEDURE — 25000003 PHARM REV CODE 250: Performed by: NURSE ANESTHETIST, CERTIFIED REGISTERED

## 2019-07-12 PROCEDURE — S0077 INJECTION, CLINDAMYCIN PHOSP: HCPCS | Performed by: PLASTIC SURGERY

## 2019-07-12 PROCEDURE — 37000008 HC ANESTHESIA 1ST 15 MINUTES: Performed by: PLASTIC SURGERY

## 2019-07-12 PROCEDURE — D9220A PRA ANESTHESIA: Mod: CRNA,,, | Performed by: NURSE ANESTHETIST, CERTIFIED REGISTERED

## 2019-07-12 PROCEDURE — D9220A PRA ANESTHESIA: ICD-10-PCS | Mod: CRNA,,, | Performed by: NURSE ANESTHETIST, CERTIFIED REGISTERED

## 2019-07-12 PROCEDURE — 36000706: Performed by: PLASTIC SURGERY

## 2019-07-12 DEVICE — ALLOGRAFT THERASKIN LG 3X6: Type: IMPLANTABLE DEVICE | Site: SCALP | Status: FUNCTIONAL

## 2019-07-12 RX ORDER — FUROSEMIDE 20 MG/1
20 TABLET ORAL 2 TIMES DAILY
Status: DISCONTINUED | OUTPATIENT
Start: 2019-07-13 | End: 2019-07-13 | Stop reason: HOSPADM

## 2019-07-12 RX ORDER — LIDOCAINE HYDROCHLORIDE AND EPINEPHRINE 10; 10 MG/ML; UG/ML
INJECTION, SOLUTION INFILTRATION; PERINEURAL
Status: DISCONTINUED | OUTPATIENT
Start: 2019-07-12 | End: 2019-07-12 | Stop reason: HOSPADM

## 2019-07-12 RX ORDER — CLINDAMYCIN PHOSPHATE 600 MG/50ML
600 INJECTION, SOLUTION INTRAVENOUS
Status: DISCONTINUED | OUTPATIENT
Start: 2019-07-12 | End: 2019-07-12 | Stop reason: HOSPADM

## 2019-07-12 RX ORDER — PSEUDOEPHEDRINE/ACETAMINOPHEN 30MG-500MG
TABLET ORAL
Status: DISCONTINUED | OUTPATIENT
Start: 2019-07-12 | End: 2019-07-12 | Stop reason: HOSPADM

## 2019-07-12 RX ORDER — AMOXICILLIN 250 MG
1 CAPSULE ORAL 2 TIMES DAILY
Status: DISCONTINUED | OUTPATIENT
Start: 2019-07-12 | End: 2019-07-13 | Stop reason: HOSPADM

## 2019-07-12 RX ORDER — FENTANYL CITRATE 50 UG/ML
INJECTION, SOLUTION INTRAMUSCULAR; INTRAVENOUS
Status: DISCONTINUED | OUTPATIENT
Start: 2019-07-12 | End: 2019-07-12

## 2019-07-12 RX ORDER — ATORVASTATIN CALCIUM 10 MG/1
20 TABLET, FILM COATED ORAL NIGHTLY
Status: DISCONTINUED | OUTPATIENT
Start: 2019-07-13 | End: 2019-07-13 | Stop reason: HOSPADM

## 2019-07-12 RX ORDER — PROPOFOL 10 MG/ML
VIAL (ML) INTRAVENOUS
Status: DISCONTINUED | OUTPATIENT
Start: 2019-07-12 | End: 2019-07-12

## 2019-07-12 RX ORDER — ENOXAPARIN SODIUM 100 MG/ML
30 INJECTION SUBCUTANEOUS EVERY 24 HOURS
Status: DISCONTINUED | OUTPATIENT
Start: 2019-07-13 | End: 2019-07-13 | Stop reason: HOSPADM

## 2019-07-12 RX ORDER — HYDROCODONE BITARTRATE AND ACETAMINOPHEN 10; 325 MG/1; MG/1
1 TABLET ORAL EVERY 4 HOURS PRN
Status: DISCONTINUED | OUTPATIENT
Start: 2019-07-12 | End: 2019-07-13 | Stop reason: HOSPADM

## 2019-07-12 RX ORDER — GLYCOPYRROLATE 0.2 MG/ML
INJECTION INTRAMUSCULAR; INTRAVENOUS
Status: DISCONTINUED | OUTPATIENT
Start: 2019-07-12 | End: 2019-07-12

## 2019-07-12 RX ORDER — ONDANSETRON 8 MG/1
8 TABLET, ORALLY DISINTEGRATING ORAL EVERY 8 HOURS PRN
Status: DISCONTINUED | OUTPATIENT
Start: 2019-07-12 | End: 2019-07-13 | Stop reason: HOSPADM

## 2019-07-12 RX ORDER — ALLOPURINOL 100 MG/1
300 TABLET ORAL DAILY
Status: DISCONTINUED | OUTPATIENT
Start: 2019-07-13 | End: 2019-07-13 | Stop reason: HOSPADM

## 2019-07-12 RX ORDER — METOPROLOL SUCCINATE 25 MG/1
25 TABLET, EXTENDED RELEASE ORAL DAILY
Status: DISCONTINUED | OUTPATIENT
Start: 2019-07-13 | End: 2019-07-13 | Stop reason: HOSPADM

## 2019-07-12 RX ORDER — BUPIVACAINE HYDROCHLORIDE 2.5 MG/ML
INJECTION, SOLUTION INFILTRATION; PERINEURAL
Status: DISCONTINUED | OUTPATIENT
Start: 2019-07-12 | End: 2019-07-12 | Stop reason: HOSPADM

## 2019-07-12 RX ORDER — DIPHENHYDRAMINE HYDROCHLORIDE 50 MG/ML
INJECTION INTRAMUSCULAR; INTRAVENOUS
Status: DISCONTINUED | OUTPATIENT
Start: 2019-07-12 | End: 2019-07-12

## 2019-07-12 RX ORDER — POLYETHYLENE GLYCOL 3350 17 G/17G
17 POWDER, FOR SOLUTION ORAL DAILY
Status: DISCONTINUED | OUTPATIENT
Start: 2019-07-13 | End: 2019-07-13 | Stop reason: HOSPADM

## 2019-07-12 RX ORDER — NAPROXEN SODIUM 220 MG/1
81 TABLET, FILM COATED ORAL DAILY
Status: DISCONTINUED | OUTPATIENT
Start: 2019-07-13 | End: 2019-07-13 | Stop reason: HOSPADM

## 2019-07-12 RX ORDER — AMLODIPINE BESYLATE 5 MG/1
5 TABLET ORAL DAILY
Status: DISCONTINUED | OUTPATIENT
Start: 2019-07-13 | End: 2019-07-13 | Stop reason: HOSPADM

## 2019-07-12 RX ORDER — ACETAMINOPHEN 325 MG/1
650 TABLET ORAL EVERY 4 HOURS PRN
Status: DISCONTINUED | OUTPATIENT
Start: 2019-07-12 | End: 2019-07-13 | Stop reason: HOSPADM

## 2019-07-12 RX ORDER — ENOXAPARIN SODIUM 150 MG/ML
85 INJECTION SUBCUTANEOUS EVERY 12 HOURS
Status: ON HOLD | COMMUNITY
End: 2019-07-13 | Stop reason: HOSPADM

## 2019-07-12 RX ORDER — LIDOCAINE HCL/PF 100 MG/5ML
SYRINGE (ML) INTRAVENOUS
Status: DISCONTINUED | OUTPATIENT
Start: 2019-07-12 | End: 2019-07-12

## 2019-07-12 RX ORDER — LOSARTAN POTASSIUM 25 MG/1
100 TABLET ORAL DAILY
Status: DISCONTINUED | OUTPATIENT
Start: 2019-07-13 | End: 2019-07-13 | Stop reason: HOSPADM

## 2019-07-12 RX ORDER — ESMOLOL HYDROCHLORIDE 10 MG/ML
INJECTION INTRAVENOUS
Status: DISCONTINUED | OUTPATIENT
Start: 2019-07-12 | End: 2019-07-12

## 2019-07-12 RX ORDER — SODIUM CHLORIDE, SODIUM LACTATE, POTASSIUM CHLORIDE, CALCIUM CHLORIDE 600; 310; 30; 20 MG/100ML; MG/100ML; MG/100ML; MG/100ML
INJECTION, SOLUTION INTRAVENOUS CONTINUOUS
Status: DISCONTINUED | OUTPATIENT
Start: 2019-07-12 | End: 2019-07-12

## 2019-07-12 RX ORDER — SODIUM CHLORIDE 0.9 % (FLUSH) 0.9 %
10 SYRINGE (ML) INJECTION
Status: DISCONTINUED | OUTPATIENT
Start: 2019-07-12 | End: 2019-07-13 | Stop reason: HOSPADM

## 2019-07-12 RX ORDER — PAROXETINE HYDROCHLORIDE 20 MG/1
40 TABLET, FILM COATED ORAL DAILY
Status: DISCONTINUED | OUTPATIENT
Start: 2019-07-13 | End: 2019-07-13 | Stop reason: HOSPADM

## 2019-07-12 RX ORDER — HYDROCODONE BITARTRATE AND ACETAMINOPHEN 5; 325 MG/1; MG/1
1 TABLET ORAL EVERY 4 HOURS PRN
Status: DISCONTINUED | OUTPATIENT
Start: 2019-07-12 | End: 2019-07-13 | Stop reason: HOSPADM

## 2019-07-12 RX ADMIN — HYDROCODONE BITARTRATE AND ACETAMINOPHEN 1 TABLET: 10; 325 TABLET ORAL at 05:07

## 2019-07-12 RX ADMIN — GLYCOPYRROLATE 0.1 MG: 0.2 INJECTION, SOLUTION INTRAMUSCULAR; INTRAVENOUS at 12:07

## 2019-07-12 RX ADMIN — FENTANYL CITRATE 25 MCG: 50 INJECTION INTRAMUSCULAR; INTRAVENOUS at 11:07

## 2019-07-12 RX ADMIN — LIDOCAINE HYDROCHLORIDE 40 MG: 20 INJECTION, SOLUTION INTRAVENOUS at 12:07

## 2019-07-12 RX ADMIN — FENTANYL CITRATE 25 MCG: 50 INJECTION INTRAMUSCULAR; INTRAVENOUS at 01:07

## 2019-07-12 RX ADMIN — DIPHENHYDRAMINE HYDROCHLORIDE 12.5 MG: 50 INJECTION, SOLUTION INTRAMUSCULAR; INTRAVENOUS at 12:07

## 2019-07-12 RX ADMIN — ESMOLOL HYDROCHLORIDE 20 MG: 10 INJECTION, SOLUTION INTRAVENOUS at 12:07

## 2019-07-12 RX ADMIN — FENTANYL CITRATE 25 MCG: 50 INJECTION INTRAMUSCULAR; INTRAVENOUS at 12:07

## 2019-07-12 RX ADMIN — PROPOFOL 10 MG: 10 INJECTION, EMULSION INTRAVENOUS at 12:07

## 2019-07-12 RX ADMIN — FENTANYL CITRATE 50 MCG: 50 INJECTION INTRAMUSCULAR; INTRAVENOUS at 12:07

## 2019-07-12 RX ADMIN — SODIUM CHLORIDE, SODIUM LACTATE, POTASSIUM CHLORIDE, AND CALCIUM CHLORIDE: .6; .31; .03; .02 INJECTION, SOLUTION INTRAVENOUS at 11:07

## 2019-07-12 RX ADMIN — CLINDAMYCIN PHOSPHATE 600 MG: 12 INJECTION, SOLUTION INTRAVENOUS at 11:07

## 2019-07-12 NOTE — ANESTHESIA PREPROCEDURE EVALUATION
07/12/2019  Tona Leone is a 89 y.o., female.    Pre-op Assessment    I have reviewed the Patient Summary Reports.     I have reviewed the Nursing Notes.   I have reviewed the Medications.     Review of Systems  Anesthesia Hx:  No problems with previous Anesthesia Denies Hx of Anesthetic complications  Neg history of prior surgery. Denies Family Hx of Anesthesia complications.   Denies Personal Hx of Anesthesia complications.   Social:  Non-Smoker, No Alcohol Use    Hematology/Oncology:  Hematology Normal   Oncology Normal     EENT/Dental:EENT/Dental Normal   Cardiovascular:   Exercise tolerance: good Hypertension CHF hyperlipidemia    Pulmonary:   Sleep Apnea Sleeps with bipap  On 2L O2   Renal/:  Renal/ Normal     Hepatic/GI:   GERD    Musculoskeletal:  Musculoskeletal Normal    Neurological:  Neurology Normal    Endocrine:  Endocrine Normal    Dermatological:  Skin Normal    Psych:   anxiety          Physical Exam  General:  Well nourished    Airway/Jaw/Neck:  Airway Findings: Mouth Opening: Normal General Airway Assessment: Adult  Mallampati: II  TM Distance: Normal, at least 6 cm         Dental:  DENTAL FINDINGS: Normal   Chest/Lungs:  Chest/Lungs Clear    Heart/Vascular:  Heart Findings: Normal Heart murmur: negative       Mental Status:  Mental Status Findings:  Cooperative, Alert and Oriented         Anesthesia Plan  Type of Anesthesia, risks & benefits discussed:  Anesthesia Type:  general, MAC  Patient's Preference:   Intra-op Monitoring Plan:   Intra-op Monitoring Plan Comments:   Post Op Pain Control Plan:   Post Op Pain Control Plan Comments:   Induction:   IV  Beta Blocker:  Patient is not currently on a Beta-Blocker (No further documentation required).       Informed Consent: Patient understands risks and agrees with Anesthesia plan.  Questions answered. Anesthesia consent signed with  patient.  ASA Score: 3     Day of Surgery Review of History & Physical:        Anesthesia Plan Notes: Seen by Dr. Hernandez pre operatively who cleared pt at mild increased risk.          Ready For Surgery From Anesthesia Perspective.

## 2019-07-12 NOTE — OP NOTE
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    LESION EXCISION SIZE:  6 x 6 cm.    SURGEON:  Mariano Olivier M.D.    ASSISTANT:  Jun.    OPERATIVE PROCEDURE:  Wide excision of squamous cell carcinoma of the scalp,   application of ____ graft 6 x 6 cm.    HISTORY:  This is a 89-year-old lady who presented to the office with a large   exophytic squamous cell carcinoma of the scalp.    She is 89 years of age and has reasonably good heart function and after lengthy   discussions with the patient and the family and the cardiologist taking care of   her, it was elected to proceed with removal of the tumor.  The patient was taken   to the Operating Room, placed under adequate basal sedation.  The scalp was   prepped with Betadine and draped in a customary fashion.  The lesion was marked   with a 1 cm margin.    The wound edges were then infiltrated with a combination of 1% Xylocaine and   0.25% Marcaine with adrenaline.  After sufficient time for hemostasis, the wound   was excised in circumferential way according to the marks.  A double suture was   placed anterior and a single suture on the right side of the lesion, dissected   off the pericranium well except towards the central portion where it was stuck.    The pericranium was then removed and attached with the overlying tumor.  The   hemostasis was achieved.  A piece of ____ skin was then placed on the wound and   stapled into position.  A tie-over bolster dressing was applied.  The patient   tolerated the procedure very well.  Estimated blood loss was 25 mL.  She went   back to the Recovery Room in good condition.      CLD/IN  dd: 07/12/2019 13:47:28 (CDT)  td: 07/12/2019 15:13:06 (CDT)  Doc ID   #[id number]  Job ID #[id number]    CC:

## 2019-07-12 NOTE — NURSING
Patient arrived to floor from PACU. Placed patient on the monitor. Visualized patient and assessed patient's overall condition and appearance. Pt has a head dressing from procedure. Site was cleaned with sterile water and dressing was reinforced. Assisted pt with the bedside pan. Daughter at bedside. NAD noted. Will continue to monitor.

## 2019-07-12 NOTE — TRANSFER OF CARE
"Anesthesia Transfer of Care Note    Patient: Tona Leone    Procedure(s) Performed: Procedure(s):  EXCISION, CARCINOMA, SQUAMOUS CELL- SCALP  APPLICATION, GRAFT- THERASKIN PLACEMENT    Anesthesia Type: MAC    Transport from OR: Transported from OR on 2-3 L/min O2 by NC with adequate spontaneous ventilation    Post pain: adequate analgesia    Post assessment: no apparent anesthetic complications    Post vital signs: stable    Level of consciousness: awake    Nausea/Vomiting: no nausea/vomiting    Complications: none    Transfer of care protocol was followed      Last vitals:   Visit Vitals  BP (!) 170/102 (BP Location: Left arm, Patient Position: Lying)   Pulse (!) 116   Temp 36.7 °C (98.1 °F) (Oral)   Resp 16   Ht 5' 7" (1.702 m)   Wt 83.9 kg (184 lb 15.5 oz)   SpO2 97%   Breastfeeding? No   BMI 28.97 kg/m²     "

## 2019-07-12 NOTE — BRIEF OP NOTE
[facility]  Brief Operative Note     SUMMARY     Surgery Date: 7/12/2019     Surgeon(s) and Role:     * Mariano Olivier MD - Primary    Assisting Surgeon: None    Pre-op Diagnosis:  Squamous cell carcinoma of scalp [C44.42]    Post-op Diagnosis:  Post-Op Diagnosis Codes:     * Squamous cell carcinoma of scalp [C44.42]  Excision size 6 x 6 cm.    Procedure(s):  EXCISION, CARCINOMA, SQUAMOUS CELL- SCALP  APPLICATION, GRAFT- THERASKIN PLACEMENT  Excision size 6 x 6 cm.    Anesthesia: General    Description of the findings of the procedure: large exophytic tumor of the scalp .  Actual tumor size was 4 x 4  x3.5 cm    Findings/Key Components: Tumor adherent to pericranium in central area.    Estimated Blood Loss: 25 cc       Dict:  [id number]  Specimens:   Specimen (12h ago, onward)    Start     Ordered    07/12/19 1304  Specimen to Pathology - Surgery  Once     Comments:  Pre-op Diagnosis: Squamous cell carcinoma of scalp [C44.42]Procedure(s):EXCISION, CARCINOMA, SQUAMOUS CELL- SCALPAPPLICATION, GRAFT- THERASKIN PLACEMENT Number of specimens: 1Name of specimens: Squamous Cell Carcinoma of Scalp; Double Stitch- Anterior, Single Stitch- Right     Start Status     07/12/19 1304 Collected (07/12/19 1305) Order ID: [id number]       07/12/19 1304          Discharge Note    SUMMARY     Admit Date: 7/12/2019    Discharge Date and Time: No discharge date for patient encounter.    Hospital Course:  Patient had large exophytic squamous cell carcinoma of the verex.  She had wide excision and application of theraskin.  Final margins pending.    Final Diagnosis: Post-Op Diagnosis Codes:     * Squamous cell carcinoma of scalp [C44.42]    Disposition: Home or Self Care    Follow Up/Patient Instructions:  Leave dressing in place.  Resume preoperative diet.  Do not wash hair.  Wear shower cap  In shower.  Office Monday.    Medications:  Reconciled Home Medications:      Medication List      ASK your doctor about these  medications    allopurinol 300 MG tablet  Commonly known as:  ZYLOPRIM  Take 1 tablet (300 mg total) by mouth once daily.     amLODIPine 5 MG tablet  Commonly known as:  NORVASC  Take 1 tablet (5 mg total) by mouth once daily.     aspirin 81 MG Chew  Take 81 mg by mouth once daily.     atorvastatin 20 MG tablet  Commonly known as:  LIPITOR  Take 1 tablet (20 mg total) by mouth every evening.     ELIQUIS 2.5 mg Tab  Generic drug:  apixaban  TAKE 1 TABLET (2.5 MG TOTAL) BY MOUTH 2 (TWO) TIMES DAILY.     enoxaparin 150 mg/mL Syrg  Commonly known as:  LOVENOX  Inject 85 mg into the skin every 12 (twelve) hours.     furosemide 20 MG tablet  Commonly known as:  LASIX  Take 1 tablet (20 mg total) by mouth 2 (two) times daily.     * HYDROcodone-acetaminophen 5-325 mg per tablet  Commonly known as:  NORCO  Take 1 tablet by mouth every 12 (twelve) hours as needed for Pain.     * HYDROcodone-acetaminophen 5-325 mg per tablet  Commonly known as:  NORCO  Take 1 tablet by mouth every 12 (twelve) hours as needed for Pain.     * HYDROcodone-acetaminophen 5-325 mg per tablet  Commonly known as:  NORCO  Take 1 tablet by mouth every 12 (twelve) hours as needed for Pain.  Start taking on:  8/2/2019     losartan 100 MG tablet  Commonly known as:  COZAAR  Take 1 tablet (100 mg total) by mouth once daily.     metoprolol succinate 25 MG 24 hr tablet  Commonly known as:  TOPROL-XL     ONE DAILY MULTIVITAMIN per tablet  Generic drug:  multivitamin  Take 1 tablet by mouth once daily.     paroxetine 40 MG tablet  Commonly known as:  PAXIL  Take 1 tablet (40 mg total) by mouth once daily.     polyethylene glycol 17 gram/dose powder  Commonly known as:  GLYCOLAX  Take 17 g by mouth daily as needed.     VITAMIN B-12 1000 MCG tablet  Generic drug:  cyanocobalamin  Take 100 mcg by mouth once daily.     VITAMIN D3 5,000 unit Tab  Generic drug:  cholecalciferol (vitamin D3)  Take 5,000 Units by mouth once daily.         * This list has 3  medication(s) that are the same as other medications prescribed for you. Read the directions carefully, and ask your doctor or other care provider to review them with you.              No discharge procedures on file.

## 2019-07-13 VITALS
WEIGHT: 184.94 LBS | RESPIRATION RATE: 18 BRPM | HEIGHT: 67 IN | OXYGEN SATURATION: 94 % | HEART RATE: 99 BPM | DIASTOLIC BLOOD PRESSURE: 72 MMHG | BODY MASS INDEX: 29.03 KG/M2 | TEMPERATURE: 98 F | SYSTOLIC BLOOD PRESSURE: 156 MMHG

## 2019-07-13 LAB
BASOPHILS # BLD AUTO: 0.03 K/UL (ref 0–0.2)
BASOPHILS NFR BLD: 0.3 % (ref 0–1.9)
DIFFERENTIAL METHOD: ABNORMAL
EOSINOPHIL # BLD AUTO: 0.7 K/UL (ref 0–0.5)
EOSINOPHIL NFR BLD: 6 % (ref 0–8)
ERYTHROCYTE [DISTWIDTH] IN BLOOD BY AUTOMATED COUNT: 15.1 % (ref 11.5–14.5)
HCT VFR BLD AUTO: 39.9 % (ref 37–48.5)
HGB BLD-MCNC: 12.5 G/DL (ref 12–16)
LYMPHOCYTES # BLD AUTO: 2.4 K/UL (ref 1–4.8)
LYMPHOCYTES NFR BLD: 20.7 % (ref 18–48)
MCH RBC QN AUTO: 30.6 PG (ref 27–31)
MCHC RBC AUTO-ENTMCNC: 31.3 G/DL (ref 32–36)
MCV RBC AUTO: 98 FL (ref 82–98)
MONOCYTES # BLD AUTO: 1.5 K/UL (ref 0.3–1)
MONOCYTES NFR BLD: 12.5 % (ref 4–15)
NEUTROPHILS # BLD AUTO: 7.1 K/UL (ref 1.8–7.7)
NEUTROPHILS NFR BLD: 60.8 % (ref 38–73)
PLATELET # BLD AUTO: 208 K/UL (ref 150–350)
PMV BLD AUTO: 9 FL (ref 9.2–12.9)
RBC # BLD AUTO: 4.08 M/UL (ref 4–5.4)
WBC # BLD AUTO: 11.76 K/UL (ref 3.9–12.7)

## 2019-07-13 PROCEDURE — 25000003 PHARM REV CODE 250: Performed by: ANESTHESIOLOGY

## 2019-07-13 PROCEDURE — 85025 COMPLETE CBC W/AUTO DIFF WBC: CPT

## 2019-07-13 PROCEDURE — 36415 COLL VENOUS BLD VENIPUNCTURE: CPT

## 2019-07-13 PROCEDURE — 25000003 PHARM REV CODE 250: Performed by: STUDENT IN AN ORGANIZED HEALTH CARE EDUCATION/TRAINING PROGRAM

## 2019-07-13 RX ORDER — LOSARTAN POTASSIUM 25 MG/1
25 TABLET ORAL ONCE
Status: COMPLETED | OUTPATIENT
Start: 2019-07-13 | End: 2019-07-13

## 2019-07-13 RX ORDER — METOPROLOL TARTRATE 25 MG/1
25 TABLET, FILM COATED ORAL ONCE
Status: COMPLETED | OUTPATIENT
Start: 2019-07-13 | End: 2019-07-13

## 2019-07-13 RX ORDER — HYDROCODONE BITARTRATE AND ACETAMINOPHEN 5; 325 MG/1; MG/1
1 TABLET ORAL EVERY 6 HOURS PRN
Qty: 12 TABLET | Refills: 0 | Status: SHIPPED | OUTPATIENT
Start: 2019-07-13 | End: 2019-07-24 | Stop reason: SDUPTHER

## 2019-07-13 RX ORDER — HYDROCODONE BITARTRATE AND ACETAMINOPHEN 5; 325 MG/1; MG/1
1 TABLET ORAL EVERY 6 HOURS PRN
Qty: 12 TABLET | Refills: 0 | Status: SHIPPED | OUTPATIENT
Start: 2019-07-13 | End: 2019-07-13 | Stop reason: SDUPTHER

## 2019-07-13 RX ADMIN — THERA TABS 1 TABLET: TAB at 08:07

## 2019-07-13 RX ADMIN — LOSARTAN POTASSIUM 25 MG: 25 TABLET, FILM COATED ORAL at 01:07

## 2019-07-13 RX ADMIN — LOSARTAN POTASSIUM 100 MG: 25 TABLET, FILM COATED ORAL at 08:07

## 2019-07-13 RX ADMIN — POLYETHYLENE GLYCOL 3350 17 G: 17 POWDER, FOR SOLUTION ORAL at 08:07

## 2019-07-13 RX ADMIN — METOPROLOL SUCCINATE 25 MG: 25 TABLET, EXTENDED RELEASE ORAL at 08:07

## 2019-07-13 RX ADMIN — APIXABAN 2.5 MG: 2.5 TABLET, FILM COATED ORAL at 08:07

## 2019-07-13 RX ADMIN — FUROSEMIDE 20 MG: 20 TABLET ORAL at 08:07

## 2019-07-13 RX ADMIN — ALLOPURINOL 300 MG: 100 TABLET ORAL at 08:07

## 2019-07-13 RX ADMIN — AMLODIPINE BESYLATE 5 MG: 5 TABLET ORAL at 08:07

## 2019-07-13 RX ADMIN — PAROXETINE HYDROCHLORIDE HEMIHYDRATE 40 MG: 20 TABLET, FILM COATED ORAL at 08:07

## 2019-07-13 RX ADMIN — METOPROLOL TARTRATE 25 MG: 25 TABLET ORAL at 01:07

## 2019-07-13 RX ADMIN — DOCUSATE SODIUM AND SENNOSIDES 1 TABLET: 8.6; 5 TABLET, FILM COATED ORAL at 08:07

## 2019-07-13 RX ADMIN — ASPIRIN 81 MG 81 MG: 81 TABLET ORAL at 08:07

## 2019-07-13 NOTE — ANESTHESIA POSTPROCEDURE EVALUATION
Anesthesia Post Evaluation    Patient: Tona Leone    Procedure(s) Performed: Procedure(s):  EXCISION, CARCINOMA, SQUAMOUS CELL- SCALP  APPLICATION, GRAFT- THERASKIN PLACEMENT    Final Anesthesia Type: general  Patient location during evaluation: PACU  Patient participation: Yes- Able to Participate  Level of consciousness: awake and alert  Post-procedure vital signs: reviewed and stable  Pain management: adequate  Airway patency: patent  PONV status at discharge: No PONV  Anesthetic complications: no      Cardiovascular status: hemodynamically stable and hypertensive  Respiratory status: unassisted and spontaneous ventilation  Hydration status: euvolemic  Follow-up not needed.  Comments: Pt was kept on extended recovery. At 1:30 am Anesth was asked to help control her bp. She had not received her usual bp meds on the day of surgery and it had crept up to 200/100. Her heart rate has been hovering close to 100 most of the day. She is written for her usual bp meds to start in 7 hours. She is not symptomatic from her HTN. Her first bp on the day of surgery also had a diastolic pressure of 100, so this may not be completely abnormal for her. Given that she is not symptomatic, may sometimes experience these pressures normally and it would be 7 hours until her usual bp meds (metoprolol, losartan, amlodipine, lasix) I opted for po rather than iv. I asked the nurse to give her a one time dose of short-acting metoprolol and a small dose of losartan and to recheck in one hour and call me if >170/75.           Vitals Value Taken Time   /92 7/13/2019 12:50 AM   Temp 36.3 °C (97.4 °F) 7/13/2019 12:16 AM   Pulse 99 7/13/2019 12:16 AM   Resp 18 7/13/2019 12:16 AM   SpO2 99 % 7/13/2019 12:16 AM         Event Time     Out of Recovery 16:05:14          Pain/Mari Score: Pain Rating Prior to Med Admin: 4 (7/12/2019  5:20 PM)  Pain Rating Post Med Admin: 2 (7/12/2019  6:20 PM)  Mari Score: 10 (7/12/2019  3:57 PM)

## 2019-07-13 NOTE — PLAN OF CARE
07/13/19 0945   Final Note   Assessment Type Final Discharge Note   Anticipated Discharge Disposition Home   What phone number can be called within the next 1-3 days to see how you are doing after discharge?   ([phone])   Hospital Follow Up  Appt(s) scheduled? No  (Unable to schedule; patient will schedule)   Discharge plans and expectations educations in teach back method with documentation complete? Yes   Right Care Referral Info   Post Acute Recommendation No Care

## 2019-07-13 NOTE — PROGRESS NOTES
Plastic Surgery Progress Note    HD #  LOS: 0 days   POD #1 Day Post-Op    Subjective:   NAEON  Pain controlled  AFVSS    Objective:  Vitals:   Temp:  [97.4 °F (36.3 °C)-98.1 °F (36.7 °C)] 97.9 °F (36.6 °C)  Pulse:  [] 99  Resp:  [16-26] 18  SpO2:  [94 %-99 %] 94 %  BP: (113-212)/() 156/72  I/O last 3 completed shifts:  In: 620 [P.O.:120; I.V.:500]  Out: 500 [Urine:500]    Intake/Output Summary (Last 24 hours) at 7/13/2019 1107  Last data filed at 7/12/2019 1700  Gross per 24 hour   Intake 620 ml   Output 500 ml   Net 120 ml       PE:  Gen: NAD  HEENT: NC/AT, EOMI, dressing in place, no erythema or drainage  CV: RRR  Resp: non-labored breathing  Abd: S/NT/ND    A/P: 89 y.o. female s/p excision of SCC from scalp    Pain control  Home today      Wily Barahona MD  [facility], [facility]  [phone]  11:07 AM  7/13/2019

## 2019-07-13 NOTE — PLAN OF CARE
07/13/19 0931   Discharge Assessment   Assessment Type Discharge Planning Assessment   Confirmed/corrected address and phone number on facesheet? Yes   Assessment information obtained from? Patient   Communicated expected length of stay with patient/caregiver yes   Prior to hospitilization cognitive status: Alert/Oriented   Prior to hospitalization functional status: Independent;Assistive Equipment;Needs Assistance  (Mostly independent; adult children assist and transport and alternate staying with her daily; uses home O2, shower chair, walker; )   Current cognitive status: Alert/Oriented   Current Functional Status: Independent;Assistive Equipment;Needs Assistance  (O2, walker, shower chair; adult children assist and transport)   Facility Arrived From: Home   Lives With child(mikayla), adult;alone  (Son and daughter alternatem in days per week)   Able to Return to Prior Arrangements yes   Is patient able to care for self after discharge? Yes  (With children's assist and DME)   Who are your caregiver(s) and their phone number(s)? Nory-daughter: [phone]; Isaiah-son: [phone]   Patient's perception of discharge disposition home or selfcare  (With children's assist)   Readmission Within the Last 30 Days no previous admission in last 30 days   Patient currently being followed by outpatient case management? No   Patient currently receives any other outside agency services? No   Equipment Currently Used at Home oxygen;shower chair;walker, rolling   Do you have any problems affording any of your prescribed medications? No   Is the patient taking medications as prescribed? yes   Does the patient have transportation home? Yes   Transportation Anticipated family or friend will provide   Does the patient receive services at the [facility]? No   Discharge Plan A Home with family   Discharge Plan B Other  (TBD)   DME Needed Upon Discharge  other (see comments)  (TBD)   Patient/Family in Agreement with Plan yes   Does the  patient have transportation to healthcare appointments? Yes  (Family transports)   SW Role explained to patient; two patient identifiers recognized; SW contact information placed on Communication board. Discussed patient managing health care at home; determined who would be helping patient at home with recovery: Adult children will help with recovery    PCP: Milla Lay MD No preference for appointment times    Extended Emergency Contact Information  Primary Emergency Contact: Nory Leone   [place]  Mobile Phone: [phone]  Relation: Daughter  Secondary Emergency Contact: Isaiah Bartlett   [facility]  Home Phone: [phone]  Relation: Son     [id number] [place] - [address]  [address] [place] [address]  Phone: [phone] Fax: [phone]    Payor: [employer] MANAGED MEDICARE / Plan: [employer] CHOICES 65 / Product Type: Medicare Advantage /

## 2019-07-13 NOTE — DISCHARGE SUMMARY
[facility] Hospital Medicine  Discharge Summary      Patient Name: Tona Leone  MRN: [id number]  Admission Date: 7/12/2019  Hospital Length of Stay: 0 days  Discharge Date and Time:  07/13/2019 11:26 AM  Attending Physician: Mariano Olivier MD  Discharging Provider: Mariano Olivier MD  Primary Care Provider: Milla Lay MD        HPI:   89 yo F with SCC of scalp s/p excision of SCC on 7/12    Procedure(s):  EXCISION, CARCINOMA, SQUAMOUS CELL- SCALP  APPLICATION, GRAFT- THERASKIN PLACEMENT      Hospital Course: Pt presented to [facility] to have the aforementioned procedure. She tolerated the procedure well under MAC and local circumferential block. She was taken to the PACU for recovery and was kept over night for observation.  She was subsequently discharged home in the AM.    Consults:     Final Active Diagnoses:    Diagnosis Date Noted POA    Squamous cell carcinoma of scalp [C44.42] 07/12/2019 Yes      Problems Resolved During this Admission:      Discharged Condition: good    Disposition: Home or Self Care    Follow Up:  Follow-up Information     Mariano Olivier MD. Schedule an appointment as soon as possible for a visit in 1 week.    Specialty:  Plastic Surgery  Why:  Call on Monday to scheduleFor wound re-check  Contact information:  [address] [place] [address]  [address]  [facility] [address] [phone]             Milla Lay MD. Schedule an appointment as soon as possible for a visit in 1 week.    Specialties:  Family Medicine, Wound Care  Why:  Call Dr. Lay's office on Monday to schedule a primary care hospital follow-up for within 1 week  Contact information:  [address] [facility] [id number] [phone]                 Patient Instructions:      Notify your health care provider if you experience any of the following:  temperature >100.4     Notify your health care provider if you experience any of the following:  severe uncontrolled pain     Notify your health care provider if you  experience any of the following:  redness, tenderness, or signs of infection (pain, swelling, redness, odor or green/yellow discharge around incision site)     Change dressing (specify)   Order Comments: Dressing change: 1 times per day using kerlix and ABD pads.     Shower on day dressing removed (No bath)   Order Comments: Please avoid getting bolster wet as much as possible     Medications:  Reconciled Home Medications:      Medication List      CHANGE how you take these medications    HYDROcodone-acetaminophen 5-325 mg per tablet  Commonly known as:  NORCO  Take 1 tablet by mouth every 6 (six) hours as needed for Pain.  What changed:    · when to take this  · Another medication with the same name was removed. Continue taking this medication, and follow the directions you see here.        CONTINUE taking these medications    allopurinol 300 MG tablet  Commonly known as:  ZYLOPRIM  Take 1 tablet (300 mg total) by mouth once daily.     amLODIPine 5 MG tablet  Commonly known as:  NORVASC  Take 1 tablet (5 mg total) by mouth once daily.     aspirin 81 MG Chew  Take 81 mg by mouth once daily.     atorvastatin 20 MG tablet  Commonly known as:  LIPITOR  Take 1 tablet (20 mg total) by mouth every evening.     ELIQUIS 2.5 mg Tab  Generic drug:  apixaban  TAKE 1 TABLET (2.5 MG TOTAL) BY MOUTH 2 (TWO) TIMES DAILY.     furosemide 20 MG tablet  Commonly known as:  LASIX  Take 1 tablet (20 mg total) by mouth 2 (two) times daily.     losartan 100 MG tablet  Commonly known as:  COZAAR  Take 1 tablet (100 mg total) by mouth once daily.     metoprolol succinate 25 MG 24 hr tablet  Commonly known as:  TOPROL-XL     ONE DAILY MULTIVITAMIN per tablet  Generic drug:  multivitamin  Take 1 tablet by mouth once daily.     paroxetine 40 MG tablet  Commonly known as:  PAXIL  Take 1 tablet (40 mg total) by mouth once daily.     polyethylene glycol 17 gram/dose powder  Commonly known as:  GLYCOLAX  Take 17 g by mouth daily as needed.      VITAMIN B-12 1000 MCG tablet  Generic drug:  cyanocobalamin  Take 100 mcg by mouth once daily.     VITAMIN D3 5,000 unit Tab  Generic drug:  cholecalciferol (vitamin D3)  Take 5,000 Units by mouth once daily.        STOP taking these medications    enoxaparin 150 mg/mL Syrg  Commonly known as:  LOVENOX            Significant Diagnostic Studies: Labs:   BMP: No results for input(s): GLU, NA, K, CL, CO2, BUN, CREATININE, CALCIUM, MG in the last 48 hours. and CBC   Recent Labs   Lab 07/13/19  0401   WBC 11.76   HGB 12.5   HCT 39.9          Pending Diagnostic Studies:     None        Indwelling Lines/Drains at time of discharge:   Lines/Drains/Airways          None             Wily Barahona MD  [facility], [facility]

## 2019-07-13 NOTE — NURSING
Patient discharge instructions given to patient and patient daughter at the time of discharge. Script not given due to they had just filled narcotic pain med recently and still had some at home. Both state understanding of information provided. IV and tele monitor discontinued. Tolerated well. All personal belongings with her at the time of discharge. Transported to personal vehicle by hospital transport wheelchair.

## 2019-07-13 NOTE — PLAN OF CARE
07/13/19 0946   Post-Acute Status   Post-Acute Authorization   (Home with adult children's assist; patient will schedule follow-ups)   Discharge Delays None known at this time

## 2019-07-13 NOTE — PROGRESS NOTES
WRITTEN DISCHARGE INSTRUCTIONS  Follow-Up Appointments: We were unable to schedule your hospital follow-up appointments. Please call on Monday to schedule your hospital follow-up appointments  Follow-up Information     Mariano Olivier MD. Schedule an appointment as soon as possible for a visit in 1 week.    Specialty:  Plastic Surgery  Why:  Call on Monday to scheduleFor wound re-check  Contact information:  [address] [place] [address]  [address]  [place] [address]  [phone]             Milla Lay MD. Schedule an appointment as soon as possible for a visit in 1 week.    Specialties:  Family Medicine, Wound Care  Why:  Call Dr. Lay's office on Monday to schedule a primary care hospital follow-up for within 1 week  Contact information:  [address] [facility] [address]  [phone]               HELP AT HOME: Ensure that you have someone to help you and to manage your healthcare at home.  [phone] After discharge for assistance with [facility]Call [facility] Care Line open 24/7 for assistance.    Things you are responsible for to Manage Your Care at Home:  1. Getting your prescriptions filled or picking up those prescriptions that have been called in for you.  2. Taking your medication as directed; DO NOT MISS ANY DOSES!  3. Going to your follow-up doctor appointment(s).  This is important because it allows your doctor to monitor your progress and determine if any changes need to be made to your treatment plan.    Thanks for choosing [facility] for your care. Please answer any calls you may receive from [facility]. We want to continue to support you as you manage your healthcare needs.  We are happy to have the opportunity to serve you.

## 2019-07-13 NOTE — PLAN OF CARE
Problem: Adult Inpatient Plan of Care  Goal: Plan of Care Review  Outcome: Ongoing (interventions implemented as appropriate)     07/13/19 0601   Plan of Care Review   Plan of Care Reviewed With patient   Progress improving

## 2019-07-24 ENCOUNTER — TELEPHONE (OUTPATIENT)
Dept: FAMILY MEDICINE | Facility: CLINIC | Age: 84
End: 2019-07-24

## 2019-07-24 RX ORDER — HYDROCODONE BITARTRATE AND ACETAMINOPHEN 5; 325 MG/1; MG/1
1 TABLET ORAL EVERY 12 HOURS PRN
Qty: 60 TABLET | Refills: 0 | Status: SHIPPED | OUTPATIENT
Start: 2019-08-03 | End: 2019-08-08 | Stop reason: SDUPTHER

## 2019-07-24 NOTE — TELEPHONE ENCOUNTER
----- Message from Shanon Horton sent at 7/24/2019 10:29 AM CDT -----  Contact: Grace Cueto  Type: Patient Call Back    Who called: Grace Cueto    What is the request in detail: Grace Cueto is requesting a call back to discuss the pt prescription. Daughter of pt states that the prescription is not signed and wanted to know if she can come in today to get a signature.     Can the clinic reply by [facility]? No    Would the patient rather a call back or a response via [facility]? Call back     Best call back number:[phone]    Additional Information:

## 2019-07-24 NOTE — TELEPHONE ENCOUNTER
Prescription can be brought in to sign tomorrow.   If not I can send it in electronically today as I am not in office to sign it today.   What would patient like?

## 2019-07-24 NOTE — TELEPHONE ENCOUNTER
Daughter says the patient has 1 rx left for the [facility] and that the Rx is not signed, Says patient is not due for the Rx until 8/2 but she was trying to get ahead by letting prescriber know its not signed.

## 2019-07-25 NOTE — INTERVAL H&P NOTE
The patient has been examined and the H&P has been reviewed:  Physician H&P Signed 07/11/2019 10:12 AM          I concur with the findings and no changes have occurred since H&P was written.    Anesthesia/Surgery risks, benefits and alternative options discussed and understood by patient/family.          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

## 2019-08-08 ENCOUNTER — OFFICE VISIT (OUTPATIENT)
Dept: FAMILY MEDICINE | Facility: CLINIC | Age: 84
End: 2019-08-08
Payer: MEDICARE

## 2019-08-08 VITALS
HEIGHT: 67 IN | HEART RATE: 68 BPM | SYSTOLIC BLOOD PRESSURE: 140 MMHG | DIASTOLIC BLOOD PRESSURE: 74 MMHG | OXYGEN SATURATION: 94 % | BODY MASS INDEX: 28.97 KG/M2 | TEMPERATURE: 98 F

## 2019-08-08 DIAGNOSIS — N18.30 CKD (CHRONIC KIDNEY DISEASE) STAGE 3, GFR 30-59 ML/MIN: ICD-10-CM

## 2019-08-08 DIAGNOSIS — C44.40 SKIN CANCER OF SCALP: ICD-10-CM

## 2019-08-08 DIAGNOSIS — J96.10 CHRONIC RESPIRATORY FAILURE, UNSPECIFIED WHETHER WITH HYPOXIA OR HYPERCAPNIA: ICD-10-CM

## 2019-08-08 DIAGNOSIS — I48.0 PAROXYSMAL ATRIAL FIBRILLATION: ICD-10-CM

## 2019-08-08 DIAGNOSIS — E78.5 HYPERLIPIDEMIA, UNSPECIFIED HYPERLIPIDEMIA TYPE: Primary | ICD-10-CM

## 2019-08-08 DIAGNOSIS — M25.511 CHRONIC RIGHT SHOULDER PAIN: ICD-10-CM

## 2019-08-08 DIAGNOSIS — I50.9 CONGESTIVE HEART FAILURE, UNSPECIFIED HF CHRONICITY, UNSPECIFIED HEART FAILURE TYPE: ICD-10-CM

## 2019-08-08 DIAGNOSIS — I10 ESSENTIAL HYPERTENSION: ICD-10-CM

## 2019-08-08 DIAGNOSIS — M17.12 ARTHRITIS OF LEFT KNEE: ICD-10-CM

## 2019-08-08 DIAGNOSIS — I73.9 PAD (PERIPHERAL ARTERY DISEASE): ICD-10-CM

## 2019-08-08 DIAGNOSIS — F11.20 OPIATE DEPENDENCE, CONTINUOUS: ICD-10-CM

## 2019-08-08 DIAGNOSIS — G89.29 CHRONIC RIGHT SHOULDER PAIN: ICD-10-CM

## 2019-08-08 PROCEDURE — 1101F PR PT FALLS ASSESS DOC 0-1 FALLS W/OUT INJ PAST YR: ICD-10-PCS | Mod: CPTII,S$GLB,, | Performed by: FAMILY MEDICINE

## 2019-08-08 PROCEDURE — 99499 UNLISTED E&M SERVICE: CPT | Mod: S$GLB,,, | Performed by: FAMILY MEDICINE

## 2019-08-08 PROCEDURE — 99214 PR OFFICE/OUTPT VISIT, EST, LEVL IV, 30-39 MIN: ICD-10-PCS | Mod: S$GLB,,, | Performed by: FAMILY MEDICINE

## 2019-08-08 PROCEDURE — 99214 OFFICE O/P EST MOD 30 MIN: CPT | Mod: S$GLB,,, | Performed by: FAMILY MEDICINE

## 2019-08-08 PROCEDURE — 99999 PR PBB SHADOW E&M-EST. PATIENT-LVL III: CPT | Mod: PBBFAC,,, | Performed by: FAMILY MEDICINE

## 2019-08-08 PROCEDURE — 1101F PT FALLS ASSESS-DOCD LE1/YR: CPT | Mod: CPTII,S$GLB,, | Performed by: FAMILY MEDICINE

## 2019-08-08 PROCEDURE — 99999 PR PBB SHADOW E&M-EST. PATIENT-LVL III: ICD-10-PCS | Mod: PBBFAC,,, | Performed by: FAMILY MEDICINE

## 2019-08-08 PROCEDURE — 99499 RISK ADDL DX/OHS AUDIT: ICD-10-PCS | Mod: S$GLB,,, | Performed by: FAMILY MEDICINE

## 2019-08-08 RX ORDER — HYDROCODONE BITARTRATE AND ACETAMINOPHEN 5; 325 MG/1; MG/1
1 TABLET ORAL EVERY 12 HOURS PRN
Qty: 60 TABLET | Refills: 0 | Status: SHIPPED | OUTPATIENT
Start: 2019-10-02 | End: 2019-10-03

## 2019-08-08 RX ORDER — HYDROCODONE BITARTRATE AND ACETAMINOPHEN 5; 325 MG/1; MG/1
1 TABLET ORAL EVERY 12 HOURS PRN
Qty: 60 TABLET | Refills: 0 | Status: SHIPPED | OUTPATIENT
Start: 2019-08-08 | End: 2019-11-07 | Stop reason: SDUPTHER

## 2019-08-08 RX ORDER — HYDROCODONE BITARTRATE AND ACETAMINOPHEN 5; 325 MG/1; MG/1
1 TABLET ORAL EVERY 12 HOURS PRN
Qty: 60 TABLET | Refills: 0 | Status: SHIPPED | OUTPATIENT
Start: 2019-11-01 | End: 2019-10-03

## 2019-08-08 NOTE — PROGRESS NOTES
Patient, Tona Leone (MRN #[id number]), presented with a recent Estimated Glumerular Filtration Rate (EGFR) between 30 and 45 consistent with the definition of chronic kidney disease stage 3 - moderate (ICD10 - N18.3).    eGFR if non    Date Value Ref Range Status   07/10/2019 33 (A) >60 mL/min/1.73 m^2 Final     Comment:     Calculation used to obtain the estimated glomerular filtration  rate (eGFR) is the CKD-EPI equation.          The patient's chronic kidney disease stage 3 was monitored, evaluated, addressed and/or treated. This addendum to the medical record is made on 08/08/2019.

## 2019-08-08 NOTE — PROGRESS NOTES
Routine Office Visit    Patient Name: Tona Leone    : 1927  MRN: [id number]    Subjective:  Tona is a 89 y.o. female who presents today for     1. Chronic pain management - pt is on chronic pain medication to help her have improvement in quality of life. She has chronic pain from arthritis in her bilateral shoulders and knees. She has hx of right knee replacement. She takes norco twice a day for severe pain. She does not abuse medication and does not take more than prescribed. She is able to communicate her need for medication. She was given oxycodone for 5 days after her surgery.   2. Skin lesion on top of her head - Patient had surgical removal of scalp lesion by Dr. Olivier and has been following up weekly. She states he plans on doing a skin graft.     Review of Systems   Constitutional: Negative for chills and fever.   HENT: Negative for congestion.    Eyes: Negative for blurred vision.   Respiratory: Negative for cough.    Cardiovascular: Negative for chest pain.   Gastrointestinal: Negative for abdominal pain, constipation, diarrhea, heartburn, nausea and vomiting.   Genitourinary: Negative for dysuria.   Musculoskeletal: Negative for myalgias.   Skin: Negative for itching and rash.   Neurological: Negative for dizziness and headaches.   Psychiatric/Behavioral: Negative for depression.       Active Problem List  Patient Active Problem List   Diagnosis    Atrial fibrillation    HTN (hypertension)    Shoulder pain    Hyperlipemia    Shoulder pain, right    Gout    Dermatitis    Postmenopausal    Mood disorder    Vitamin D deficiency disease    PAD (peripheral artery disease)    Arthritis of left knee    Chronic pain of left knee    Lesion of subcutaneous tissue    Congestive heart failure (CHF)    CHRIS treated with BiPAP    Opiate dependence, continuous    Chronic respiratory failure    Skin cancer of scalp    Squamous cell carcinoma of scalp       Past Surgical History  Past Surgical  History:   Procedure Laterality Date    APPLICATION, GRAFT- THERASKIN PLACEMENT  2019    [facility]    CANCER REMOVED FROM SCALP/HEAD N/A 2019    CATARACT EXTRACTION      ou    EXCISION, CARCINOMA, SQUAMOUS CELL- SCALP  2019    [facility]    FULL THICKNESS SKIN GRAFT TO THE LEFT FOREARM Left 2018    [facility] at [facility]    HARVEST-GRAFT-SKIN Left 2018    [facility] OR    HERNIA REPAIR      HYSTERECTOMY      INSERTION OF PACEMAKER      JOINT REPLACEMENT      Rt  knee  replacement    skin grafts      TONSILLECTOMY         Family History  Family History   Problem Relation Age of Onset    Hypertension Mother     Cataracts Mother     Hypertension Father     Cataracts Father     Hypertension Brother     Cataracts Brother     Amblyopia Neg Hx     Blindness Neg Hx     Cancer Neg Hx     Diabetes Neg Hx     Macular degeneration Neg Hx     Retinal detachment Neg Hx     Strabismus Neg Hx     Stroke Neg Hx     Thyroid disease Neg Hx        Social History  Social History     Socioeconomic History    Marital status:      Spouse name: Not on file    Number of children: Not on file    Years of education: Not on file    Highest education level: Not on file   Occupational History    Not on file   Social Needs    Financial resource strain: Not on file    Food insecurity:     Worry: Not on file     Inability: Not on file    Transportation needs:     Medical: Not on file     Non-medical: Not on file   Tobacco Use    Smoking status: Former Smoker     Last attempt to quit: 1969     Years since quittin.6    Smokeless tobacco: Never Used   Substance and Sexual Activity    Alcohol use: No    Drug use: Never    Sexual activity: Not Currently     Partners: Male   Lifestyle    Physical activity:     Days per week: Not on file     Minutes per session:  Not on file    Stress: Not at all   Relationships    Social connections:     Talks on phone: Not on file     Gets together: Not on file     Attends [employer] service: Not on file     Active member of club or organization: Not on file     Attends meetings of clubs or organizations: Not on file     Relationship status: Not on file   Other Topics Concern    Not on file   Social History Narrative    Not on file       Medications and Allergies  Reviewed and updated.   Current Outpatient Medications   Medication Sig    allopurinol (ZYLOPRIM) 300 MG tablet Take 1 tablet (300 mg total) by mouth once daily.    amLODIPine (NORVASC) 5 MG tablet Take 5 mg by mouth.    apixaban (ELIQUIS) 2.5 mg Tab TAKE 1 TABLET (2.5 MG TOTAL) BY MOUTH 2 (TWO) TIMES DAILY.    aspirin 81 MG Chew Take 81 mg by mouth.    atorvastatin (LIPITOR) 20 MG tablet Take 1 tablet (20 mg total) by mouth every evening.    cholecalciferol, vitamin D3, (VITAMIN D3) 5,000 unit Tab Take 5,000 Units by mouth once daily.    cyanocobalamin (VITAMIN B-12) 1000 MCG tablet Take 100 mcg by mouth once daily.    ELIQUIS 2.5 mg Tab TAKE 1 TABLET (2.5 MG TOTAL) BY MOUTH 2 (TWO) TIMES DAILY.    enoxaparin (LOVENOX) 100 mg/mL Syrg INJECT 85MG UNDER THE SKIN 2 TIMES A DAY. START JULY 8.    furosemide (LASIX) 20 MG tablet Take 1 tablet (20 mg total) by mouth 2 (two) times daily.    HYDROcodone-acetaminophen (NORCO) 5-325 mg per tablet Take 1 tablet by mouth every 12 (twelve) hours as needed for Pain.    [START ON 10/2/2019] HYDROcodone-acetaminophen (NORCO) 5-325 mg per tablet Take 1 tablet by mouth every 12 (twelve) hours as needed for Pain.    [START ON 11/1/2019] HYDROcodone-acetaminophen (NORCO) 5-325 mg per tablet Take 1 tablet by mouth every 12 (twelve) hours as needed for Pain.    losartan (COZAAR) 100 MG tablet Take 1 tablet (100 mg total) by mouth once daily.    metoprolol succinate (TOPROL-XL) 25 MG 24 hr tablet TAKE 1 TABLET BY ORAL ROUTE EVERY DAY  "   multivitamin (ONE DAILY MULTIVITAMIN) per tablet Take 1 tablet by mouth.    oxyCODONE-acetaminophen (PERCOCET) 5-325 mg per tablet Take 1 tablet by mouth every 4 (four) hours as needed. for pain.    paroxetine (PAXIL) 40 MG tablet Take 1 tablet (40 mg total) by mouth once daily.    polyethylene glycol (GLYCOLAX) 17 gram PwPk Take by mouth.     No current facility-administered medications for this visit.        Physical Exam  BP (!) 140/74 (BP Location: Left arm, Patient Position: Sitting, BP Method: Large (Manual))   Pulse 68   Temp 97.9 °F (36.6 °C) (Oral)   Ht 5' 7" (1.702 m)   SpO2 (!) 94% Comment: 2.5 LITERS OF OXYGEN  BMI 28.97 kg/m²   Physical Exam   Constitutional: She is oriented to person, place, and time. She appears well-developed and well-nourished.   HENT:   Head: Normocephalic and atraumatic.   Eyes: Pupils are equal, round, and reactive to light. Conjunctivae and EOM are normal.   Neck: Normal range of motion. Neck supple.   Cardiovascular: Normal rate, regular rhythm and normal heart sounds. Exam reveals no gallop and no friction rub.   No murmur heard.  Pulmonary/Chest: Breath sounds normal. No respiratory distress.   Abdominal: Soft. Bowel sounds are normal. She exhibits no distension. There is no tenderness.   Musculoskeletal: Normal range of motion.   Lymphadenopathy:     She has no cervical adenopathy.   Neurological: She is alert and oriented to person, place, and time.   Skin: Skin is warm.   Psychiatric: She has a normal mood and affect.         Assessment/Plan:  Tona Leone is a 89 y.o. female who presents today for :    Problem List Items Addressed This Visit        Psychiatric    Opiate dependence, continuous    Relevant Medications    HYDROcodone-acetaminophen (NORCO) 5-325 mg per tablet    HYDROcodone-acetaminophen (NORCO) 5-325 mg per tablet (Start on 10/2/2019)    HYDROcodone-acetaminophen (NORCO) 5-325 mg per tablet (Start on 11/1/2019)  Reviewed [place]   The current " medical regimen is effective;  continue present plan and medications.  Common side effects of this medication were discussed with the patient. Questions regarding medications were discussed during this visit.         Pulmonary    Chronic respiratory failure  The current medical regimen is effective;  continue present plan and medications.  Patient on home oxygen          Cardiac/Vascular    Atrial fibrillation    Congestive heart failure (CHF)    HTN (hypertension)    Hyperlipemia - Primary    PAD (peripheral artery disease)    The current medical regimen is effective;  continue present plan and medications.         Oncology    Skin cancer of scalp  Continue f/u with Dr. Olivier        Orthopedic    Arthritis of left knee    Relevant Medications    HYDROcodone-acetaminophen (NORCO) 5-325 mg per tablet    HYDROcodone-acetaminophen (NORCO) 5-325 mg per tablet (Start on 10/2/2019)    HYDROcodone-acetaminophen (NORCO) 5-325 mg per tablet (Start on 11/1/2019)    Shoulder pain, right    Relevant Medications    HYDROcodone-acetaminophen (NORCO) 5-325 mg per tablet    HYDROcodone-acetaminophen (NORCO) 5-325 mg per tablet (Start on 10/2/2019)    HYDROcodone-acetaminophen (NORCO) 5-325 mg per tablet (Start on 11/1/2019)            Follow up in about 3 months (around 11/8/2019).

## 2019-10-03 ENCOUNTER — TELEPHONE (OUTPATIENT)
Dept: FAMILY MEDICINE | Facility: CLINIC | Age: 84
End: 2019-10-03

## 2019-10-03 DIAGNOSIS — M17.12 ARTHRITIS OF LEFT KNEE: ICD-10-CM

## 2019-10-03 DIAGNOSIS — M25.511 CHRONIC RIGHT SHOULDER PAIN: ICD-10-CM

## 2019-10-03 DIAGNOSIS — F11.20 OPIATE DEPENDENCE, CONTINUOUS: ICD-10-CM

## 2019-10-03 DIAGNOSIS — G89.29 CHRONIC RIGHT SHOULDER PAIN: ICD-10-CM

## 2019-10-03 RX ORDER — HYDROCODONE BITARTRATE AND ACETAMINOPHEN 5; 325 MG/1; MG/1
1 TABLET ORAL EVERY 12 HOURS PRN
Qty: 60 TABLET | Refills: 0 | Status: SHIPPED | OUTPATIENT
Start: 2019-11-01 | End: 2019-12-01

## 2019-10-03 RX ORDER — HYDROCODONE BITARTRATE AND ACETAMINOPHEN 5; 325 MG/1; MG/1
1 TABLET ORAL EVERY 12 HOURS PRN
Qty: 60 TABLET | Refills: 0 | Status: SHIPPED | OUTPATIENT
Start: 2019-10-03 | End: 2019-11-02

## 2019-10-03 NOTE — TELEPHONE ENCOUNTER
Patient's daughter, Nory said that she needs have Dr. Lay document on her [place] Rx. that it is medically necessary for her to get more than a 7 day supply so that she may get it filled at [facility] because [facility] is out of the med until next week.  I told Patient's daughter that she will need to bring the Rx. back to the clinic so that Dr. Lay may add the needed documentation.  She verbalized understanding and intent to comply.

## 2019-10-03 NOTE — TELEPHONE ENCOUNTER
----- Message from Jillian Cleaning sent at 10/3/2019  1:10 PM CDT -----  Contact: daughter Nory Leone ph [phone]  Type: Patient Call Back    Who called: daughter Nory Leone    What is the request in detail: Calling in regards to HYDROcodone-acetaminophen (NORCO) 5-325 mg per tablet. States [facility] is out of the medication for 7 days. Was told to contact PCP to find out if she can get a written Rx to bring to [facility] and on the Rx it must state that it's medically necessary to take more than 7 days. Please contact daughter in regards to this. If possible, she would like to know if the Rx can be faxed to  .    [facility]  [address] [place] [address]  Ph [phone]    Would the patient rather a call back or a response via [facility]?  Call back    Best call back number: [phone]    Additional Information: Please contact Nory to let know if the Rx can be faxed or not. Please contact.

## 2019-10-24 ENCOUNTER — PATIENT OUTREACH (OUTPATIENT)
Dept: ADMINISTRATIVE | Facility: HOSPITAL | Age: 84
End: 2019-10-24

## 2019-10-24 RX ORDER — SULFAMETHOXAZOLE AND TRIMETHOPRIM 800; 160 MG/1; MG/1
TABLET ORAL
Refills: 0 | COMMUNITY
Start: 2019-08-16

## 2019-10-24 RX ORDER — ATORVASTATIN CALCIUM 40 MG/1
40 TABLET, FILM COATED ORAL
COMMUNITY
Start: 2018-08-30 | End: 2019-11-07 | Stop reason: DRUGHIGH

## 2019-10-24 RX ORDER — METOPROLOL SUCCINATE 25 MG/1
TABLET, EXTENDED RELEASE ORAL
COMMUNITY
Start: 2019-03-21 | End: 2019-11-07 | Stop reason: SDUPTHER

## 2019-10-24 RX ORDER — POLYETHYLENE GLYCOL 3350 17 G/17G
POWDER, FOR SOLUTION ORAL
COMMUNITY

## 2019-10-24 RX ORDER — FUROSEMIDE 20 MG/1
TABLET ORAL
COMMUNITY
Start: 2019-01-18 | End: 2019-11-07 | Stop reason: SDUPTHER

## 2019-10-29 NOTE — TELEPHONE ENCOUNTER
Chucky notified of David Dugan's message below and verbalized understanding.  She states that the patient has an appointment with dermatology next week and she will speak to patient about being sure to keep it.   [FreeTextEntry1] : at baseline\par active - dancing, now jogging\par having hemoptysis minor since September, slightly worse\par no fever, chill, chest pain\par no epistasis\par on Breo daily\par recent MEENU prox LAD , on plavix since March

## 2019-11-07 ENCOUNTER — OFFICE VISIT (OUTPATIENT)
Dept: FAMILY MEDICINE | Facility: CLINIC | Age: 84
End: 2019-11-07
Payer: MEDICARE

## 2019-11-07 VITALS
BODY MASS INDEX: 31.15 KG/M2 | SYSTOLIC BLOOD PRESSURE: 136 MMHG | HEIGHT: 67 IN | DIASTOLIC BLOOD PRESSURE: 80 MMHG | HEART RATE: 73 BPM | TEMPERATURE: 98 F | WEIGHT: 198.44 LBS | OXYGEN SATURATION: 97 %

## 2019-11-07 DIAGNOSIS — I73.9 PAD (PERIPHERAL ARTERY DISEASE): ICD-10-CM

## 2019-11-07 DIAGNOSIS — Z78.0 POSTMENOPAUSAL: ICD-10-CM

## 2019-11-07 DIAGNOSIS — F11.20 OPIATE DEPENDENCE, CONTINUOUS: Primary | ICD-10-CM

## 2019-11-07 DIAGNOSIS — M17.12 ARTHRITIS OF LEFT KNEE: ICD-10-CM

## 2019-11-07 DIAGNOSIS — G89.29 CHRONIC RIGHT SHOULDER PAIN: ICD-10-CM

## 2019-11-07 DIAGNOSIS — M25.511 CHRONIC RIGHT SHOULDER PAIN: ICD-10-CM

## 2019-11-07 DIAGNOSIS — N18.30 CKD (CHRONIC KIDNEY DISEASE) STAGE 3, GFR 30-59 ML/MIN: ICD-10-CM

## 2019-11-07 DIAGNOSIS — E78.5 HYPERLIPIDEMIA, UNSPECIFIED HYPERLIPIDEMIA TYPE: ICD-10-CM

## 2019-11-07 DIAGNOSIS — I50.9 CONGESTIVE HEART FAILURE, UNSPECIFIED HF CHRONICITY, UNSPECIFIED HEART FAILURE TYPE: ICD-10-CM

## 2019-11-07 DIAGNOSIS — I10 ESSENTIAL HYPERTENSION: ICD-10-CM

## 2019-11-07 PROCEDURE — 99499 UNLISTED E&M SERVICE: CPT | Mod: S$GLB,,, | Performed by: FAMILY MEDICINE

## 2019-11-07 PROCEDURE — 99999 PR PBB SHADOW E&M-EST. PATIENT-LVL III: CPT | Mod: PBBFAC,,, | Performed by: FAMILY MEDICINE

## 2019-11-07 PROCEDURE — 99999 PR PBB SHADOW E&M-EST. PATIENT-LVL III: ICD-10-PCS | Mod: PBBFAC,,, | Performed by: FAMILY MEDICINE

## 2019-11-07 PROCEDURE — 1101F PT FALLS ASSESS-DOCD LE1/YR: CPT | Mod: CPTII,S$GLB,, | Performed by: FAMILY MEDICINE

## 2019-11-07 PROCEDURE — 99214 OFFICE O/P EST MOD 30 MIN: CPT | Mod: S$GLB,,, | Performed by: FAMILY MEDICINE

## 2019-11-07 PROCEDURE — 99499 RISK ADDL DX/OHS AUDIT: ICD-10-PCS | Mod: S$GLB,,, | Performed by: FAMILY MEDICINE

## 2019-11-07 PROCEDURE — 99214 PR OFFICE/OUTPT VISIT, EST, LEVL IV, 30-39 MIN: ICD-10-PCS | Mod: S$GLB,,, | Performed by: FAMILY MEDICINE

## 2019-11-07 PROCEDURE — 1101F PR PT FALLS ASSESS DOC 0-1 FALLS W/OUT INJ PAST YR: ICD-10-PCS | Mod: CPTII,S$GLB,, | Performed by: FAMILY MEDICINE

## 2019-11-07 RX ORDER — PAROXETINE HYDROCHLORIDE 40 MG/1
40 TABLET, FILM COATED ORAL DAILY
Qty: 90 TABLET | Refills: 3 | Status: SHIPPED | OUTPATIENT
Start: 2019-11-07

## 2019-11-07 RX ORDER — ATORVASTATIN CALCIUM 20 MG/1
20 TABLET, FILM COATED ORAL NIGHTLY
Qty: 90 TABLET | Refills: 3 | Status: SHIPPED | OUTPATIENT
Start: 2019-11-07

## 2019-11-07 RX ORDER — AMLODIPINE BESYLATE 5 MG/1
5 TABLET ORAL DAILY
Qty: 90 TABLET | Refills: 3 | Status: SHIPPED | OUTPATIENT
Start: 2019-11-07

## 2019-11-07 RX ORDER — HYDROCODONE BITARTRATE AND ACETAMINOPHEN 5; 325 MG/1; MG/1
1 TABLET ORAL EVERY 12 HOURS PRN
Qty: 60 TABLET | Refills: 0 | Status: SHIPPED | OUTPATIENT
Start: 2020-01-30 | End: 2020-02-29

## 2019-11-07 RX ORDER — LOSARTAN POTASSIUM 100 MG/1
100 TABLET ORAL DAILY
Qty: 90 TABLET | Refills: 3 | Status: SHIPPED | OUTPATIENT
Start: 2019-11-07

## 2019-11-07 RX ORDER — HYDROCODONE BITARTRATE AND ACETAMINOPHEN 5; 325 MG/1; MG/1
1 TABLET ORAL EVERY 12 HOURS PRN
Qty: 60 TABLET | Refills: 0 | Status: SHIPPED | OUTPATIENT
Start: 2019-12-01 | End: 2019-12-31

## 2019-11-07 RX ORDER — METOPROLOL SUCCINATE 25 MG/1
TABLET, EXTENDED RELEASE ORAL
Qty: 90 TABLET | Refills: 3 | Status: SHIPPED | OUTPATIENT
Start: 2019-11-07

## 2019-11-07 RX ORDER — FUROSEMIDE 20 MG/1
20 TABLET ORAL DAILY
Qty: 90 TABLET | Refills: 3 | Status: SHIPPED | OUTPATIENT
Start: 2019-11-07

## 2019-11-07 RX ORDER — ALLOPURINOL 300 MG/1
300 TABLET ORAL DAILY
Qty: 90 TABLET | Refills: 3 | Status: SHIPPED | OUTPATIENT
Start: 2019-11-07

## 2019-11-07 RX ORDER — HYDROCODONE BITARTRATE AND ACETAMINOPHEN 5; 325 MG/1; MG/1
1 TABLET ORAL EVERY 12 HOURS PRN
Qty: 60 TABLET | Refills: 0 | Status: SHIPPED | OUTPATIENT
Start: 2019-12-31 | End: 2020-01-30

## 2019-11-07 NOTE — PROGRESS NOTES
Routine Office Visit    Patient Name: Tona Leone    : 1927  MRN: [id number]    Subjective:  Tona is a 89 y.o. female who presents today for     1. Chronic pain management - pt is on chronic pain medication to help her have improvement in quality of life. She has chronic pain from arthritis in her bilateral shoulders and knees. She has hx of right knee replacement. She takes norco twice a day for severe pain. She does not abuse medication and does not take more than prescribed. She is able to communicate her need for medication. She and her daughter report no issues/ recent falls / side effects from current medication regimen.   2. Skin lesion on top of her head - s/p skin graft has improved     Review of Systems   Constitutional: Negative for chills and fever.   HENT: Negative for congestion.    Eyes: Negative for blurred vision.   Respiratory: Negative for cough.    Cardiovascular: Negative for chest pain.   Gastrointestinal: Negative for abdominal pain, constipation, diarrhea, heartburn, nausea and vomiting.   Genitourinary: Negative for dysuria.   Musculoskeletal: Negative for myalgias.   Skin: Negative for itching and rash.   Neurological: Negative for dizziness and headaches.   Psychiatric/Behavioral: Negative for depression.       Active Problem List  Patient Active Problem List   Diagnosis    Atrial fibrillation    HTN (hypertension)    Shoulder pain    Hyperlipemia    Shoulder pain, right    Gout    Dermatitis    Postmenopausal    Mood disorder    Vitamin D deficiency disease    PAD (peripheral artery disease)    Arthritis of left knee    Chronic pain of left knee    Lesion of subcutaneous tissue    Congestive heart failure (CHF)    CHRIS treated with BiPAP    Opiate dependence, continuous    Chronic respiratory failure    Skin cancer of scalp    Squamous cell carcinoma of scalp    CKD (chronic kidney disease) stage 3, GFR 30-59 ml/min       Past Surgical History  Past Surgical  History:   Procedure Laterality Date    CANCER REMOVED FROM SCALP/HEAD N/A 2019    CATARACT EXTRACTION      ou    EXCISION OF SQUAMOUS CELL CARCINOMA  2019    Procedure: EXCISION, CARCINOMA, SQUAMOUS CELL- SCALP;  Surgeon: Mariano Olivier MD;  Location: [facility] OR;  Service: Plastics;;  MINH PAN KWASI -[phone] SPOKE TO HIM @ 8:56AM ON 19 FOR MARQUISE GAMING PRE OP 7-10-19    HERNIA REPAIR      HYSTERECTOMY      INSERTION OF PACEMAKER      JOINT REPLACEMENT      Rt  knee  replacement    skin grafts      TONSILLECTOMY         Family History  Family History   Problem Relation Age of Onset    Hypertension Mother     Cataracts Mother     Hypertension Father     Cataracts Father     Hypertension Brother     Cataracts Brother     Amblyopia Neg Hx     Blindness Neg Hx     Cancer Neg Hx     Diabetes Neg Hx     Macular degeneration Neg Hx     Retinal detachment Neg Hx     Strabismus Neg Hx     Stroke Neg Hx     Thyroid disease Neg Hx        Social History  Social History     Socioeconomic History    Marital status:      Spouse name: Not on file    Number of children: Not on file    Years of education: Not on file    Highest education level: Not on file   Occupational History    Not on file   Social Needs    Financial resource strain: Not on file    Food insecurity:     Worry: Not on file     Inability: Not on file    Transportation needs:     Medical: Not on file     Non-medical: Not on file   Tobacco Use    Smoking status: Former Smoker     Last attempt to quit: 1969     Years since quittin.8    Smokeless tobacco: Never Used   Substance and Sexual Activity    Alcohol use: No    Drug use: Never    Sexual activity: Not Currently     Partners: Male   Lifestyle    Physical activity:     Days per week: Not on file     Minutes per session: Not on file    Stress: Not at all   Relationships    Social connections:     Talks on phone: Not on file     Gets  together: Not on file     Attends [employer] service: Not on file     Active member of club or organization: Not on file     Attends meetings of clubs or organizations: Not on file     Relationship status: Not on file   Other Topics Concern    Not on file   Social History Narrative    Not on file       Medications and Allergies  Reviewed and updated.   Current Outpatient Medications   Medication Sig    allopurinol (ZYLOPRIM) 300 MG tablet Take 1 tablet (300 mg total) by mouth once daily.    amLODIPine (NORVASC) 5 MG tablet Take 1 tablet (5 mg total) by mouth once daily.    apixaban (ELIQUIS) 2.5 mg Tab TAKE 1 TABLET (2.5 MG TOTAL) BY MOUTH 2 (TWO) TIMES DAILY.    apixaban (ELIQUIS) 2.5 mg Tab TAKE 1 TABLET (2.5 MG TOTAL) BY MOUTH 2 (TWO) TIMES DAILY.    aspirin 81 MG Chew Take 81 mg by mouth.    atorvastatin (LIPITOR) 20 MG tablet Take 1 tablet (20 mg total) by mouth every evening.    cholecalciferol, vitamin D3, (VITAMIN D3) 5,000 unit Tab Take 5,000 Units by mouth once daily.    cyanocobalamin (VITAMIN B-12) 1000 MCG tablet Take 100 mcg by mouth once daily.    ELIQUIS 2.5 mg Tab TAKE 1 TABLET (2.5 MG TOTAL) BY MOUTH 2 (TWO) TIMES DAILY.    enoxaparin (LOVENOX) 100 mg/mL Syrg INJECT 85MG UNDER THE SKIN 2 TIMES A DAY. START JULY 8.    FLUAD [id number], 65 YR UP,,PF, 45 mcg (15 mcg x 3)/0.5 mL Syrg     furosemide (LASIX) 20 MG tablet Take 1 tablet (20 mg total) by mouth once daily.    HYDROcodone-acetaminophen (NORCO) 5-325 mg per tablet Take 1 tablet by mouth every 12 (twelve) hours as needed for Pain.    [START ON 12/31/2019] HYDROcodone-acetaminophen (NORCO) 5-325 mg per tablet Take 1 tablet by mouth every 12 (twelve) hours as needed for Pain.    [START ON 12/1/2019] HYDROcodone-acetaminophen (NORCO) 5-325 mg per tablet Take 1 tablet by mouth every 12 (twelve) hours as needed for Pain.    [START ON 1/30/2020] HYDROcodone-acetaminophen (NORCO) 5-325 mg per tablet Take 1 tablet by mouth every 12  "(twelve) hours as needed for Pain.    losartan (COZAAR) 100 MG tablet Take 1 tablet (100 mg total) by mouth once daily.    metoprolol succinate (TOPROL-XL) 25 MG 24 hr tablet TAKE 1 TABLET BY ORAL ROUTE EVERY DAY    metoprolol succinate (TOPROL-XL) 25 MG 24 hr tablet TAKE 1 TABLET BY ORAL ROUTE EVERY DAY    multivitamin (ONE DAILY MULTIVITAMIN) per tablet Take 1 tablet by mouth.    oxyCODONE-acetaminophen (PERCOCET) 5-325 mg per tablet Take 1 tablet by mouth every 4 (four) hours as needed. for pain.    paroxetine (PAXIL) 40 MG tablet Take 1 tablet (40 mg total) by mouth once daily.    polyethylene glycol (GLYCOLAX) 17 gram PwPk Take by mouth.    sulfamethoxazole-trimethoprim 800-160mg (BACTRIM DS) 800-160 mg Tab TAKE 1 TABLET BY MOUTH TWICE A DAY UNTIL FINISHED     No current facility-administered medications for this visit.        Physical Exam  /80 (BP Location: Left arm, Patient Position: Sitting, BP Method: Large (Manual))   Pulse 73   Temp 98.1 °F (36.7 °C) (Oral)   Ht 5' 7" (1.702 m)   Wt 90 kg (198 lb 6.6 oz)   SpO2 97%   BMI 31.08 kg/m²   Physical Exam   Constitutional: She is oriented to person, place, and time. She appears well-developed and well-nourished.   HENT:   Head: Normocephalic and atraumatic.   Eyes: Pupils are equal, round, and reactive to light. Conjunctivae and EOM are normal.   Neck: Normal range of motion. Neck supple.   Cardiovascular: Normal rate, regular rhythm and normal heart sounds. Exam reveals no gallop and no friction rub.   No murmur heard.  Pulmonary/Chest: Breath sounds normal. No respiratory distress.   Abdominal: Soft. Bowel sounds are normal. She exhibits no distension. There is no tenderness.   Musculoskeletal: Normal range of motion.   Lymphadenopathy:     She has no cervical adenopathy.   Neurological: She is alert and oriented to person, place, and time.   Skin: Skin is warm.   Psychiatric: She has a normal mood and affect. "         Assessment/Plan:  Tona Leone is a 89 y.o. female who presents today for :    Problem List Items Addressed This Visit        Psychiatric    Opiate dependence, continuous - Primary    Relevant Medications    HYDROcodone-acetaminophen (NORCO) 5-325 mg per tablet (Start on 12/31/2019)    HYDROcodone-acetaminophen (NORCO) 5-325 mg per tablet (Start on 12/1/2019)    HYDROcodone-acetaminophen (NORCO) 5-325 mg per tablet (Start on 1/30/2020)  The current medical regimen is effective;  continue present plan and medications.  Reviewed LA          Cardiac/Vascular    Congestive heart failure (CHF)    HTN (hypertension)    Relevant Medications    losartan (COZAAR) 100 MG tablet    furosemide (LASIX) 20 MG tablet  The current medical regimen is effective;  continue present plan and medications.      Hyperlipemia    Relevant Medications    atorvastatin (LIPITOR) 20 MG tablet  The current medical regimen is effective;  continue present plan and medications.       PAD (peripheral artery disease)       Renal/    CKD (chronic kidney disease) stage 3, GFR 30-59 ml/min  Noted in chart  Continue to monitor  Avoid nsaids       Postmenopausal    Relevant Medications    paroxetine (PAXIL) 40 MG tablet  The current medical regimen is effective;  continue present plan and medications.           Orthopedic    Arthritis of left knee    Relevant Medications    HYDROcodone-acetaminophen (NORCO) 5-325 mg per tablet (Start on 12/31/2019)    HYDROcodone-acetaminophen (NORCO) 5-325 mg per tablet (Start on 12/1/2019)    HYDROcodone-acetaminophen (NORCO) 5-325 mg per tablet (Start on 1/30/2020)    Shoulder pain    Relevant Medications    HYDROcodone-acetaminophen (NORCO) 5-325 mg per tablet (Start on 12/31/2019)    HYDROcodone-acetaminophen (NORCO) 5-325 mg per tablet (Start on 12/1/2019)    HYDROcodone-acetaminophen (NORCO) 5-325 mg per tablet (Start on 1/30/2020)            Follow up in about 3 months (around 2/7/2020).

## 2019-12-13 ENCOUNTER — TELEPHONE (OUTPATIENT)
Dept: FAMILY MEDICINE | Facility: CLINIC | Age: 84
End: 2019-12-13

## 2019-12-13 NOTE — TELEPHONE ENCOUNTER
----- Message from Kathy Rosario sent at 12/13/2019 10:17 AM CST -----  Type: Patient Call Back    Who called: Essie Traylor with [facility] 's office     What is the request in detail: Rep would like to know if Dr. Lay is willing to sign death certificate. Pt passed this morning.     Can the clinic reply by [facility]? No     Would the patient rather a call back or a response via [facility]? Call back     Best call back number: [phone]    Additional Information:

## 2019-12-17 ENCOUNTER — TELEPHONE (OUTPATIENT)
Dept: FAMILY MEDICINE | Facility: CLINIC | Age: 84
End: 2019-12-17

## 2019-12-17 NOTE — TELEPHONE ENCOUNTER
----- Message from Mckayla Aguirre sent at 12/17/2019 12:33 PM CST -----  Contact: pt's daughter tana [phone]  Type: Patient Call Back    Who called:pt's daughter tana [phone]    What is the request in detail:pt daughter says that her mom passed on 12/13/19. fyi    Can the clinic reply by [facility]?    Would the patient rather a call back or a response via [facility]? call    Best call back number:pt's daughter tana [phone]    Additional Information:

## 2019-12-19 NOTE — TELEPHONE ENCOUNTER
I called to offer my condolences.  No answer.   I did receive the death certificate, but this needs to be signed by the  as I do not know the cause of her death.   Officially, the death certificate must be signed the .

## 2020-01-23 ENCOUNTER — PATIENT OUTREACH (OUTPATIENT)
Dept: ADMINISTRATIVE | Facility: HOSPITAL | Age: 85
End: 2020-01-23

## 2020-02-04 ENCOUNTER — TELEPHONE (OUTPATIENT)
Dept: FAMILY MEDICINE | Facility: CLINIC | Age: 85
End: 2020-02-04

## 2020-02-04 NOTE — TELEPHONE ENCOUNTER
Called to confirm appointment, and spoke with patients daughter she stated that her mother has passed away 12/12/2019.

## (undated) DEVICE — SEE MEDLINE ITEM 152745

## (undated) DEVICE — SOL 9P NACL IRR PIC IL

## (undated) DEVICE — SEE MEDLINE ITEM 154981

## (undated) DEVICE — SUT 2-0 12-18IN SILK

## (undated) DEVICE — SEE MEDLINE ITEM 157166

## (undated) DEVICE — SUT 3-0 CTD VICRYL 27IN PS

## (undated) DEVICE — SOL IRR NACL .9% 3000ML

## (undated) DEVICE — SYR 10CC LUER LOCK

## (undated) DEVICE — SEE MEDLINE ITEM 157117

## (undated) DEVICE — SYS CLSR DERMABOND PRINEO 22CM

## (undated) DEVICE — NDL HYPO REG 25G X 1 1/2

## (undated) DEVICE — CORD FOR BIPOLAR FORCEPS 12

## (undated) DEVICE — BLADE SURG STAINLESS STEEL #15

## (undated) DEVICE — BLANKET LOWER BODY 55.9X40.2IN

## (undated) DEVICE — SEE MEDLINE ITEM 152622

## (undated) DEVICE — DRESSING TRANS 8X12 TEGADERM

## (undated) DEVICE — SEE MEDLINE ITEM 152530

## (undated) DEVICE — CLOSURE SKIN STERI STRIP 1/2X4

## (undated) DEVICE — GLOVE SURGICAL LATEX SZ 7

## (undated) DEVICE — SPONGE DERMACEA GAUZE 4X4

## (undated) DEVICE — SUT SILK 2-0 PS 18IN BLACK

## (undated) DEVICE — SEE L#120831

## (undated) DEVICE — PACK HEAD & NECK

## (undated) DEVICE — SPLINT PLASTER F.S 4INX15IN

## (undated) DEVICE — ELECTRODE REM PLYHSV RETURN 9

## (undated) DEVICE — SET DECANTER MEDICHOICE

## (undated) DEVICE — DRESSING N ADH OIL EMUL 3X3

## (undated) DEVICE — SPONGE LAP 18X18 PREWASHED

## (undated) DEVICE — GAUZE SPONGE 4X4 12PLY

## (undated) DEVICE — CLIPPER BLADE MOD 4406 (CAREF)

## (undated) DEVICE — PAD ABD 8X10 STERILE

## (undated) DEVICE — GLOVE BIOGEL PI MICRO SZ 8

## (undated) DEVICE — Device

## (undated) DEVICE — PACK DRAPE UNIVERSAL CONVERTOR

## (undated) DEVICE — STAPLER SKIN REGULAR

## (undated) DEVICE — SUT CTD VICRYL 3-0 CR/SH

## (undated) DEVICE — DRESSING N ADH OIL EMUL 3X8 3S

## (undated) DEVICE — SPONGE GAUZE 16PLY 4X4

## (undated) DEVICE — SEE MEDLINE ITEM 152487

## (undated) DEVICE — COTTONBALL LG ST

## (undated) DEVICE — SEE MEDLINE ITEM 157216

## (undated) DEVICE — SEE MEDLINE ITEM 146270

## (undated) DEVICE — BANDAGE DERMACEA STRETCH 4X1IN

## (undated) DEVICE — CONTAINER SPECIMEN STRL 4OZ

## (undated) DEVICE — ELECTRODE NEEDLE 1IN

## (undated) DEVICE — SLING ORTHOPEDIC LARGE

## (undated) DEVICE — COVER OVERHEAD SURG LT BLUE

## (undated) DEVICE — SUT VICRYL 4-0 RB1 27IN UD

## (undated) DEVICE — NDL 18GA X1 1/2 REG BEVEL

## (undated) DEVICE — SPLINT PLASTER F.S. 3INX15IN

## (undated) DEVICE — SYS LABLNG CORECT MED 4 FLG

## (undated) DEVICE — PADDING 4X4YD SPECIALIST100

## (undated) DEVICE — SEE ITEM #81378

## (undated) DEVICE — SPONGE DERMACEA 4X4IN 12PLY

## (undated) DEVICE — NDL SPINAL 18GX3.5 SPINOCAN